# Patient Record
Sex: MALE | Race: WHITE | NOT HISPANIC OR LATINO | Employment: FULL TIME | URBAN - METROPOLITAN AREA
[De-identification: names, ages, dates, MRNs, and addresses within clinical notes are randomized per-mention and may not be internally consistent; named-entity substitution may affect disease eponyms.]

---

## 2018-01-11 ENCOUNTER — APPOINTMENT (OUTPATIENT)
Dept: GENERAL RADIOLOGY | Facility: HOSPITAL | Age: 58
End: 2018-01-11

## 2018-01-11 ENCOUNTER — HOSPITAL ENCOUNTER (INPATIENT)
Facility: HOSPITAL | Age: 58
LOS: 1 days | End: 2018-01-12
Attending: SPECIALIST | Admitting: SPECIALIST

## 2018-01-11 ENCOUNTER — HOSPITAL ENCOUNTER (EMERGENCY)
Facility: HOSPITAL | Age: 58
Discharge: PSYCHIATRIC OR SUBSTANCE USE FACILITY | End: 2018-01-11
Attending: EMERGENCY MEDICINE | Admitting: EMERGENCY MEDICINE

## 2018-01-11 VITALS
SYSTOLIC BLOOD PRESSURE: 141 MMHG | HEART RATE: 62 BPM | DIASTOLIC BLOOD PRESSURE: 79 MMHG | WEIGHT: 275 LBS | RESPIRATION RATE: 17 BRPM | TEMPERATURE: 97 F | OXYGEN SATURATION: 90 % | BODY MASS INDEX: 35.31 KG/M2

## 2018-01-11 VITALS
BODY MASS INDEX: 34.65 KG/M2 | OXYGEN SATURATION: 90 % | SYSTOLIC BLOOD PRESSURE: 163 MMHG | WEIGHT: 270 LBS | TEMPERATURE: 98.1 F | HEIGHT: 74 IN | RESPIRATION RATE: 17 BRPM | DIASTOLIC BLOOD PRESSURE: 91 MMHG | HEART RATE: 65 BPM

## 2018-01-11 DIAGNOSIS — I10 UNCONTROLLED HYPERTENSION: ICD-10-CM

## 2018-01-11 DIAGNOSIS — F41.1 ANXIETY REACTION: ICD-10-CM

## 2018-01-11 DIAGNOSIS — R07.9 CHEST PAIN, UNSPECIFIED TYPE: ICD-10-CM

## 2018-01-11 DIAGNOSIS — F32.2 SEVERE MAJOR DEPRESSION (HCC): Primary | ICD-10-CM

## 2018-01-11 PROBLEM — F32.A DEPRESSION WITH SUICIDAL IDEATION: Status: ACTIVE | Noted: 2018-01-11

## 2018-01-11 PROBLEM — E11.9 DM2 (DIABETES MELLITUS, TYPE 2) (HCC): Status: ACTIVE | Noted: 2018-01-11

## 2018-01-11 PROBLEM — F17.200 SMOKING: Status: ACTIVE | Noted: 2018-01-11

## 2018-01-11 PROBLEM — R51.9 HEADACHE: Status: ACTIVE | Noted: 2018-01-11

## 2018-01-11 PROBLEM — Z99.89 OSA ON CPAP: Status: ACTIVE | Noted: 2018-01-11

## 2018-01-11 PROBLEM — E78.5 DYSLIPIDEMIA: Status: ACTIVE | Noted: 2018-01-11

## 2018-01-11 PROBLEM — R45.851 DEPRESSION WITH SUICIDAL IDEATION: Status: ACTIVE | Noted: 2018-01-11

## 2018-01-11 PROBLEM — K21.9 GERD (GASTROESOPHAGEAL REFLUX DISEASE): Status: ACTIVE | Noted: 2018-01-11

## 2018-01-11 PROBLEM — G47.33 OSA (OBSTRUCTIVE SLEEP APNEA): Status: ACTIVE | Noted: 2018-01-11

## 2018-01-11 LAB
ALBUMIN SERPL-MCNC: 4.3 G/DL (ref 3.5–5.2)
ALBUMIN/GLOB SERPL: 1.1 G/DL
ALP SERPL-CCNC: 42 U/L (ref 39–117)
ALT SERPL W P-5'-P-CCNC: 33 U/L (ref 1–41)
AMPHET+METHAMPHET UR QL: NEGATIVE
ANION GAP SERPL CALCULATED.3IONS-SCNC: 10.9 MMOL/L
AST SERPL-CCNC: 23 U/L (ref 1–40)
BACTERIA UR QL AUTO: ABNORMAL /HPF
BARBITURATES UR QL SCN: POSITIVE
BASOPHILS # BLD AUTO: 0.03 10*3/MM3 (ref 0–0.2)
BASOPHILS NFR BLD AUTO: 0.5 % (ref 0–1.5)
BENZODIAZ UR QL SCN: POSITIVE
BILIRUB SERPL-MCNC: 0.4 MG/DL (ref 0.1–1.2)
BILIRUB UR QL STRIP: NEGATIVE
BUN BLD-MCNC: 16 MG/DL (ref 6–20)
BUN/CREAT SERPL: 14.7 (ref 7–25)
CALCIUM SPEC-SCNC: 9.9 MG/DL (ref 8.6–10.5)
CANNABINOIDS SERPL QL: NEGATIVE
CHLORIDE SERPL-SCNC: 101 MMOL/L (ref 98–107)
CLARITY UR: CLEAR
CO2 SERPL-SCNC: 26.1 MMOL/L (ref 22–29)
COCAINE UR QL: NEGATIVE
COLOR UR: YELLOW
CREAT BLD-MCNC: 1.09 MG/DL (ref 0.76–1.27)
DEPRECATED RDW RBC AUTO: 44 FL (ref 37–54)
EOSINOPHIL # BLD AUTO: 0.14 10*3/MM3 (ref 0–0.7)
EOSINOPHIL NFR BLD AUTO: 2.2 % (ref 0.3–6.2)
ERYTHROCYTE [DISTWIDTH] IN BLOOD BY AUTOMATED COUNT: 13.1 % (ref 11.5–14.5)
ETHANOL BLD-MCNC: <10 MG/DL (ref 0–10)
ETHANOL UR QL: <0.01 %
GFR SERPL CREATININE-BSD FRML MDRD: 70 ML/MIN/1.73
GFR SERPL CREATININE-BSD FRML MDRD: 85 ML/MIN/1.73
GLOBULIN UR ELPH-MCNC: 4 GM/DL
GLUCOSE BLD-MCNC: 123 MG/DL (ref 65–99)
GLUCOSE BLDC GLUCOMTR-MCNC: 99 MG/DL (ref 70–130)
GLUCOSE UR STRIP-MCNC: NEGATIVE MG/DL
HCT VFR BLD AUTO: 49.5 % (ref 40.4–52.2)
HGB BLD-MCNC: 16.7 G/DL (ref 13.7–17.6)
HGB UR QL STRIP.AUTO: ABNORMAL
HYALINE CASTS UR QL AUTO: ABNORMAL /LPF
IMM GRANULOCYTES # BLD: 0 10*3/MM3 (ref 0–0.03)
IMM GRANULOCYTES NFR BLD: 0 % (ref 0–0.5)
INR PPP: 1.03 (ref 0.9–1.1)
KETONES UR QL STRIP: NEGATIVE
LEUKOCYTE ESTERASE UR QL STRIP.AUTO: ABNORMAL
LYMPHOCYTES # BLD AUTO: 2.71 10*3/MM3 (ref 0.9–4.8)
LYMPHOCYTES NFR BLD AUTO: 42.9 % (ref 19.6–45.3)
MCH RBC QN AUTO: 31.3 PG (ref 27–32.7)
MCHC RBC AUTO-ENTMCNC: 33.7 G/DL (ref 32.6–36.4)
MCV RBC AUTO: 92.9 FL (ref 79.8–96.2)
METHADONE UR QL SCN: NEGATIVE
MONOCYTES # BLD AUTO: 0.48 10*3/MM3 (ref 0.2–1.2)
MONOCYTES NFR BLD AUTO: 7.6 % (ref 5–12)
NEUTROPHILS # BLD AUTO: 2.96 10*3/MM3 (ref 1.9–8.1)
NEUTROPHILS NFR BLD AUTO: 46.8 % (ref 42.7–76)
NITRITE UR QL STRIP: NEGATIVE
OPIATES UR QL: NEGATIVE
OXYCODONE UR QL SCN: NEGATIVE
PH UR STRIP.AUTO: 5.5 [PH] (ref 5–8)
PLATELET # BLD AUTO: 247 10*3/MM3 (ref 140–500)
PMV BLD AUTO: 10.2 FL (ref 6–12)
POTASSIUM BLD-SCNC: 3.9 MMOL/L (ref 3.5–5.2)
PROT SERPL-MCNC: 8.3 G/DL (ref 6–8.5)
PROT UR QL STRIP: NEGATIVE
PROTHROMBIN TIME: 13.1 SECONDS (ref 11.7–14.2)
RBC # BLD AUTO: 5.33 10*6/MM3 (ref 4.6–6)
RBC # UR: ABNORMAL /HPF
REF LAB TEST METHOD: ABNORMAL
SODIUM BLD-SCNC: 138 MMOL/L (ref 136–145)
SP GR UR STRIP: 1.02 (ref 1–1.03)
SQUAMOUS #/AREA URNS HPF: ABNORMAL /HPF
T4 FREE SERPL-MCNC: 1.16 NG/DL (ref 0.93–1.7)
TROPONIN T SERPL-MCNC: <0.01 NG/ML (ref 0–0.03)
TSH SERPL DL<=0.05 MIU/L-ACNC: 1.17 MIU/ML (ref 0.27–4.2)
UROBILINOGEN UR QL STRIP: ABNORMAL
WBC NRBC COR # BLD: 6.32 10*3/MM3 (ref 4.5–10.7)
WBC UR QL AUTO: ABNORMAL /HPF

## 2018-01-11 PROCEDURE — 80307 DRUG TEST PRSMV CHEM ANLYZR: CPT | Performed by: EMERGENCY MEDICINE

## 2018-01-11 PROCEDURE — 25010000002 ONDANSETRON PER 1 MG: Performed by: EMERGENCY MEDICINE

## 2018-01-11 PROCEDURE — 93005 ELECTROCARDIOGRAM TRACING: CPT | Performed by: EMERGENCY MEDICINE

## 2018-01-11 PROCEDURE — 96376 TX/PRO/DX INJ SAME DRUG ADON: CPT

## 2018-01-11 PROCEDURE — 84484 ASSAY OF TROPONIN QUANT: CPT | Performed by: EMERGENCY MEDICINE

## 2018-01-11 PROCEDURE — 85025 COMPLETE CBC W/AUTO DIFF WBC: CPT | Performed by: EMERGENCY MEDICINE

## 2018-01-11 PROCEDURE — 71046 X-RAY EXAM CHEST 2 VIEWS: CPT

## 2018-01-11 PROCEDURE — 81001 URINALYSIS AUTO W/SCOPE: CPT | Performed by: SPECIALIST

## 2018-01-11 PROCEDURE — 84439 ASSAY OF FREE THYROXINE: CPT | Performed by: SPECIALIST

## 2018-01-11 PROCEDURE — 80053 COMPREHEN METABOLIC PANEL: CPT | Performed by: EMERGENCY MEDICINE

## 2018-01-11 PROCEDURE — 90791 PSYCH DIAGNOSTIC EVALUATION: CPT | Performed by: SOCIAL WORKER

## 2018-01-11 PROCEDURE — 85610 PROTHROMBIN TIME: CPT | Performed by: EMERGENCY MEDICINE

## 2018-01-11 PROCEDURE — 96375 TX/PRO/DX INJ NEW DRUG ADDON: CPT

## 2018-01-11 PROCEDURE — 96374 THER/PROPH/DIAG INJ IV PUSH: CPT

## 2018-01-11 PROCEDURE — 84443 ASSAY THYROID STIM HORMONE: CPT | Performed by: SPECIALIST

## 2018-01-11 PROCEDURE — 93010 ELECTROCARDIOGRAM REPORT: CPT | Performed by: INTERNAL MEDICINE

## 2018-01-11 PROCEDURE — 99284 EMERGENCY DEPT VISIT MOD MDM: CPT

## 2018-01-11 PROCEDURE — 82962 GLUCOSE BLOOD TEST: CPT

## 2018-01-11 RX ORDER — NEBIVOLOL 10 MG/1
10 TABLET ORAL DAILY
COMMUNITY
End: 2019-01-10

## 2018-01-11 RX ORDER — EZETIMIBE/ATORVASTATIN CALCIUM 10 MG-10MG
10 TABLET ORAL DAILY
COMMUNITY

## 2018-01-11 RX ORDER — FLUOXETINE 10 MG/1
10 CAPSULE ORAL DAILY
Status: CANCELLED | OUTPATIENT
Start: 2018-01-12 | End: 2018-01-13

## 2018-01-11 RX ORDER — ONDANSETRON 2 MG/ML
4 INJECTION INTRAMUSCULAR; INTRAVENOUS ONCE
Status: COMPLETED | OUTPATIENT
Start: 2018-01-11 | End: 2018-01-11

## 2018-01-11 RX ORDER — ALPRAZOLAM 1 MG/1
1 TABLET ORAL 3 TIMES DAILY PRN
COMMUNITY

## 2018-01-11 RX ORDER — LISINOPRIL 40 MG/1
40 TABLET ORAL DAILY
Status: CANCELLED | OUTPATIENT
Start: 2018-01-12

## 2018-01-11 RX ORDER — DEXTROSE MONOHYDRATE 25 G/50ML
25 INJECTION, SOLUTION INTRAVENOUS
Status: CANCELLED | OUTPATIENT
Start: 2018-01-11

## 2018-01-11 RX ORDER — NICOTINE 21 MG/24HR
1 PATCH, TRANSDERMAL 24 HOURS TRANSDERMAL EVERY 24 HOURS
Status: DISCONTINUED | OUTPATIENT
Start: 2018-01-11 | End: 2018-01-12 | Stop reason: HOSPADM

## 2018-01-11 RX ORDER — BUTALBITAL, ACETAMINOPHEN AND CAFFEINE 50; 325; 40 MG/1; MG/1; MG/1
1 TABLET ORAL EVERY 6 HOURS PRN
Status: CANCELLED | OUTPATIENT
Start: 2018-01-11

## 2018-01-11 RX ORDER — ACETAMINOPHEN 325 MG/1
650 TABLET ORAL EVERY 6 HOURS PRN
Status: CANCELLED | OUTPATIENT
Start: 2018-01-11

## 2018-01-11 RX ORDER — ATORVASTATIN CALCIUM 10 MG/1
5 TABLET, FILM COATED ORAL NIGHTLY
Status: DISCONTINUED | OUTPATIENT
Start: 2018-01-11 | End: 2018-01-12 | Stop reason: HOSPADM

## 2018-01-11 RX ORDER — ALPRAZOLAM 1 MG/1
1 TABLET ORAL 3 TIMES DAILY PRN
Status: CANCELLED | OUTPATIENT
Start: 2018-01-11 | End: 2018-01-21

## 2018-01-11 RX ORDER — FENOFIBRATE 160 MG/1
160 TABLET ORAL DAILY
COMMUNITY
End: 2018-01-12 | Stop reason: HOSPADM

## 2018-01-11 RX ORDER — LISINOPRIL 20 MG/1
20 TABLET ORAL DAILY
COMMUNITY
End: 2018-01-11

## 2018-01-11 RX ORDER — FLUOXETINE 10 MG/1
10 CAPSULE ORAL DAILY
Status: DISCONTINUED | OUTPATIENT
Start: 2018-01-12 | End: 2018-01-12 | Stop reason: HOSPADM

## 2018-01-11 RX ORDER — ASPIRIN 81 MG/1
81 TABLET, CHEWABLE ORAL DAILY
Status: DISCONTINUED | OUTPATIENT
Start: 2018-01-12 | End: 2018-01-12 | Stop reason: HOSPADM

## 2018-01-11 RX ORDER — LABETALOL HYDROCHLORIDE 5 MG/ML
10 INJECTION, SOLUTION INTRAVENOUS ONCE
Status: DISCONTINUED | OUTPATIENT
Start: 2018-01-11 | End: 2018-01-11

## 2018-01-11 RX ORDER — BUTALBITAL/ACETAMINOPHEN 50MG-325MG
1 TABLET ORAL EVERY 4 HOURS PRN
Status: CANCELLED | OUTPATIENT
Start: 2018-01-11

## 2018-01-11 RX ORDER — FENOFIBRATE 145 MG/1
145 TABLET, COATED ORAL DAILY
Status: DISCONTINUED | OUTPATIENT
Start: 2018-01-12 | End: 2018-01-12 | Stop reason: HOSPADM

## 2018-01-11 RX ORDER — FLUOXETINE 10 MG/1
10 CAPSULE ORAL DAILY
COMMUNITY
End: 2018-01-14 | Stop reason: HOSPADM

## 2018-01-11 RX ORDER — BUTALBITAL/ACETAMINOPHEN 50MG-325MG
1 TABLET ORAL EVERY 4 HOURS PRN
COMMUNITY
End: 2019-01-10

## 2018-01-11 RX ORDER — LISINOPRIL 40 MG/1
40 TABLET ORAL DAILY
Qty: 30 TABLET | Refills: 1 | Status: ON HOLD | OUTPATIENT
Start: 2018-01-11 | End: 2019-01-11 | Stop reason: SDUPTHER

## 2018-01-11 RX ORDER — LABETALOL HYDROCHLORIDE 5 MG/ML
10 INJECTION, SOLUTION INTRAVENOUS ONCE
Status: COMPLETED | OUTPATIENT
Start: 2018-01-11 | End: 2018-01-11

## 2018-01-11 RX ORDER — NEBIVOLOL 10 MG/1
10 TABLET ORAL DAILY
Status: DISCONTINUED | OUTPATIENT
Start: 2018-01-12 | End: 2018-01-12 | Stop reason: HOSPADM

## 2018-01-11 RX ORDER — ALPRAZOLAM 1 MG/1
1 TABLET ORAL 3 TIMES DAILY PRN
Status: DISCONTINUED | OUTPATIENT
Start: 2018-01-11 | End: 2018-01-12 | Stop reason: HOSPADM

## 2018-01-11 RX ORDER — ACETAMINOPHEN 325 MG/1
650 TABLET ORAL EVERY 6 HOURS PRN
Status: DISCONTINUED | OUTPATIENT
Start: 2018-01-11 | End: 2018-01-12 | Stop reason: HOSPADM

## 2018-01-11 RX ORDER — FENOFIBRATE 145 MG/1
145 TABLET, COATED ORAL DAILY
Status: CANCELLED | OUTPATIENT
Start: 2018-01-12

## 2018-01-11 RX ORDER — SODIUM CHLORIDE 0.9 % (FLUSH) 0.9 %
1-10 SYRINGE (ML) INJECTION AS NEEDED
Status: CANCELLED | OUTPATIENT
Start: 2018-01-11

## 2018-01-11 RX ORDER — ALUMINA, MAGNESIA, AND SIMETHICONE 2400; 2400; 240 MG/30ML; MG/30ML; MG/30ML
15 SUSPENSION ORAL EVERY 6 HOURS PRN
Status: CANCELLED | OUTPATIENT
Start: 2018-01-11

## 2018-01-11 RX ORDER — HEPARIN SODIUM 5000 [USP'U]/ML
5000 INJECTION, SOLUTION INTRAVENOUS; SUBCUTANEOUS EVERY 12 HOURS SCHEDULED
Status: CANCELLED | OUTPATIENT
Start: 2018-01-12

## 2018-01-11 RX ORDER — ASPIRIN 81 MG/1
81 TABLET, CHEWABLE ORAL DAILY
COMMUNITY
End: 2019-01-10

## 2018-01-11 RX ORDER — ASPIRIN 81 MG/1
81 TABLET, CHEWABLE ORAL DAILY
Status: CANCELLED | OUTPATIENT
Start: 2018-01-12 | End: 2018-01-13

## 2018-01-11 RX ORDER — NICOTINE 21 MG/24HR
1 PATCH, TRANSDERMAL 24 HOURS TRANSDERMAL EVERY 24 HOURS
Status: CANCELLED | OUTPATIENT
Start: 2018-01-12

## 2018-01-11 RX ORDER — BUTALBITAL/ACETAMINOPHEN 50MG-325MG
1 TABLET ORAL EVERY 4 HOURS PRN
Status: DISCONTINUED | OUTPATIENT
Start: 2018-01-11 | End: 2018-01-12 | Stop reason: HOSPADM

## 2018-01-11 RX ORDER — LABETALOL HYDROCHLORIDE 5 MG/ML
20 INJECTION, SOLUTION INTRAVENOUS ONCE
Status: COMPLETED | OUTPATIENT
Start: 2018-01-11 | End: 2018-01-11

## 2018-01-11 RX ORDER — NEBIVOLOL 10 MG/1
10 TABLET ORAL DAILY
Status: CANCELLED | OUTPATIENT
Start: 2018-01-12 | End: 2018-01-13

## 2018-01-11 RX ORDER — NICOTINE POLACRILEX 4 MG
15 LOZENGE BUCCAL
Status: CANCELLED | OUTPATIENT
Start: 2018-01-11

## 2018-01-11 RX ORDER — ALUMINA, MAGNESIA, AND SIMETHICONE 2400; 2400; 240 MG/30ML; MG/30ML; MG/30ML
15 SUSPENSION ORAL EVERY 6 HOURS PRN
Status: DISCONTINUED | OUTPATIENT
Start: 2018-01-11 | End: 2018-01-12 | Stop reason: HOSPADM

## 2018-01-11 RX ORDER — PANTOPRAZOLE SODIUM 40 MG/1
40 TABLET, DELAYED RELEASE ORAL
Status: CANCELLED | OUTPATIENT
Start: 2018-01-12 | End: 2018-01-13

## 2018-01-11 RX ORDER — PANTOPRAZOLE SODIUM 40 MG/1
40 TABLET, DELAYED RELEASE ORAL
Status: DISCONTINUED | OUTPATIENT
Start: 2018-01-12 | End: 2018-01-12 | Stop reason: HOSPADM

## 2018-01-11 RX ORDER — OMEPRAZOLE 20 MG/1
40 CAPSULE, DELAYED RELEASE ORAL DAILY
COMMUNITY

## 2018-01-11 RX ADMIN — ACETAMINOPHEN 650 MG: 325 TABLET ORAL at 20:19

## 2018-01-11 RX ADMIN — ALPRAZOLAM 1 MG: 1 TABLET ORAL at 21:58

## 2018-01-11 RX ADMIN — METFORMIN HYDROCHLORIDE 500 MG: 500 TABLET ORAL at 20:19

## 2018-01-11 RX ADMIN — HYDROMORPHONE HYDROCHLORIDE 1 MG: 10 INJECTION INTRAMUSCULAR; INTRAVENOUS; SUBCUTANEOUS at 13:15

## 2018-01-11 RX ADMIN — LABETALOL HYDROCHLORIDE 20 MG: 5 INJECTION, SOLUTION INTRAVENOUS at 15:42

## 2018-01-11 RX ADMIN — ONDANSETRON 4 MG: 2 INJECTION INTRAMUSCULAR; INTRAVENOUS at 13:15

## 2018-01-11 RX ADMIN — LABETALOL HYDROCHLORIDE 10 MG: 5 INJECTION, SOLUTION INTRAVENOUS at 13:14

## 2018-01-11 NOTE — ED NOTES
Spoke with Dr. Powell about pt's wife's voiced concerns about his depression. He states that he will order psych evaluation after results.      Yoli Schneider RN  01/11/18 4845

## 2018-01-11 NOTE — CONSULTS
"59 y/o cauc  father of 4 referred to the [facility] ED by his PCP Dr. Liang due to concerns for his increasing depression and concerns for his safety.  He has also been having a headache and elevated BP.  He was evaluated 3 days ago at [facility] and referred to their IOP. Patient started yesterday.  Patient called PCP today and reported increased depression and above medical issues.  Patient reports that he has multiple stressors. His primary issues is that he is under indictment for a federal crime.  Patient reports that as a result of it he resolved as  of [facility].  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 He states the [facility]  has told him that the charges will probably be dropped.  He states that a rumor was out in the county that the rumor was out that they were going to be dropped and politicians in [facility] filed a $630,000 lawsuit against him.  Due to  fees he has had increased financial issues.  Patient reports that he has been isolating in his \"[facility].\" He states that that is the only place that he feels safe.  He reports that over the years that he has seen multiple suicides and suicide attempts.  He states that he has always thought that he would never act on any suicidal thoughts but most recently he has began to wonder if he could actually do it.  He has never developed a specific plan.  Patient states that he has HTN but it has been much higher lately.  He has had to have BP medications in the ED.  He reports that he has had difficulty getting sleep.  His appetite is good.  He is diabetic.  Patient states he has begun to worry even more and can't stop thinking about what will happen if the charges are dropped.  He fears what will happen w/ he and wife if there isn't a decreased in legal financial burden.      Patient has been on 1 mg up to 3 times/ day for anxiety and 10 mg Prozac/day. They are prescribed by his PCP Dr. Alexandre Liang.  He denies any " hx of ETOH or illicit drug use.  He states that he ran for [employer] b/c of the struggle his oldest son had w/ drugs.  Son has been clean for 3 years. He attended [facility] for one day.    Patient lives w/ his 5th wife.  He has 4 children. Wife is supportive.  He is a retired .  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  He reports positive support from family.      Patient is alert and O x 4.  +SI without specific plan.  No HI.  No a/v hallucinations. Speech is appropriate. Insight and judgement are appropriate.  Discussed case w/ Dr. Cifuentes.  He will admit to [facility].  Patient is agreeable.  He has been provided the code number, visiting hours, and unit rules.

## 2018-01-11 NOTE — ED NOTES
Pt to ER c/o chest pressure and hypertension. Pt appears diaphoretic at triage. Pt placed in ER bed per request of Dr. Mason. EKG given to Dr. Powell per request of Dr. Mason. Pt's wife, who is at bedsides, states that pt has been under larger amounts of stress in personal life. When questioned, pt denies any intention of self harm or harm to others. Pt states that although he has been taking his medication, his blood pressure has been elevated over past few weeks.      Yoli Schneider RN  01/11/18 1437

## 2018-01-11 NOTE — ED PROVIDER NOTES
EMERGENCY DEPARTMENT ENCOUNTER    CHIEF COMPLAINT  Chief Complaint: Hypertension  History given by: pt/ spouse is present at [facility]  History limited by: N/A  Room Number: 40/40  PMD: Alexandre Liang MD      HPI:  Pt is a 58 y.o. male who presents complaining of hypertension for the past 2-3 days. Pt reports recent elevated BP's of around 185/95. Pt has a current BP of 176/ 91. PT states this is abnormal for him. Pt states his PCP monitors his BP, but pt does not monitor his own BP; pt states his BP was normal the last time he saw his PCP which was approximately 4-6 weeks ago. Pt also c/o sharp frontal HA that has been ongoing for 1 month with worsening over the past week [moderate to severe], diaphoresis, moderate chest discomfort for the past 2-3 days that progressed to chest pain, dizziness, 1 episode of diarrhea, and SOB, but denies fever, sore throat, cough, abdominal pain, dysuria, back/ leg pain, rash, or any other pertinent symptoms. Pt smokes up to 2 packs of cigarettes a day, but denies any alcohol or substance usage/ abuse. Pt states he has thought about suicide, but denies ever actually wanting to perform this action. PT has a hx of depression and spouse states that pt is under tremendous amounts of stress. Pt does have a hx of hypertension.      Duration/Onset/Timin-3 days, gradual, constant   Location: N/A  Radiation: N/A  Quality: Current BP of 176/91  Intensity/Severity: moderate   Associated Symptoms:  sharp frontal HA that has been ongoing for 1 month with worsening over the past week [moderate to severe], diaphoresis, moderate chest discomfort for the past 2-3 days that progressed to chest pain, dizziness, 1 episode of diarrhea and SOB  Aggravating or Alleviating Factors: None reported, but spouse states that pt is under tremendous stress   Previous Episodes: Pt has a hx of hypertension.       PAST MEDICAL HISTORY  Active Ambulatory Problems     Diagnosis Date Noted   • No Active Ambulatory  Problems     Resolved Ambulatory Problems     Diagnosis Date Noted   • No Resolved Ambulatory Problems     Past Medical History:   Diagnosis Date   • Anxiety    • Depression    • Diabetes mellitus    • GERD (gastroesophageal reflux disease)    • Head ache    • Hypertension        PAST SURGICAL HISTORY  Past Surgical History:   Procedure Laterality Date   • CHOLECYSTECTOMY         FAMILY HISTORY  History reviewed. No pertinent family history.    SOCIAL HISTORY  Social History     Social History   • Marital status:      Spouse name: N/A   • Number of children: N/A   • Years of education: N/A     Occupational History   • Not on file.     Social History Main Topics   • Smoking status: Current Every Day Smoker     Packs/day: 2.00   • Smokeless tobacco: Not on file   • Alcohol use No   • Drug use: No   • Sexual activity: Defer     Other Topics Concern   • Not on file     Social History Narrative   • No narrative on file       ALLERGIES  Codeine    REVIEW OF SYSTEMS  Review of Systems   Constitutional: Positive for diaphoresis. Negative for chills and fever.   HENT: Negative for sore throat.    Eyes: Negative.    Respiratory: Positive for shortness of breath. Negative for cough.    Cardiovascular: Positive for chest pain.   Gastrointestinal: Positive for diarrhea (x1 ). Negative for abdominal pain.   Genitourinary: Negative.  Negative for dysuria.   Musculoskeletal: Negative.  Negative for back pain and myalgias (leg pain ).   Skin: Negative.  Negative for rash.   Neurological: Positive for dizziness and headaches.       PHYSICAL EXAM  ED Triage Vitals   Temp Heart Rate Resp BP SpO2   -- 01/11/18 1154 01/11/18 1154 01/11/18 1154 01/11/18 1154    76 20 183/97 93 %      Temp src Heart Rate Source Patient Position BP Location FiO2 (%)   -- 01/11/18 1154 -- -- --    Monitor          Physical Exam   Constitutional: He is well-developed, well-nourished, and in no distress. No distress.   Current / 91  Pt is tearful  on exam    HENT:   Head: Normocephalic and atraumatic.   Mouth/Throat: Oropharynx is clear and moist.   Eyes:   Unremarkable   Cardiovascular: Normal rate, regular rhythm and normal heart sounds.    No murmur heard.  Pulmonary/Chest: Breath sounds normal. No respiratory distress.   Lungs clear to auscultation    Abdominal: There is tenderness (mild LUQ).   Musculoskeletal: He exhibits no edema or tenderness.   Neurological: He is alert.   Skin: No rash noted.   ACE Bandage to R hand    Psychiatric: His mood appears anxious.   Nursing note and vitals reviewed.      LAB RESULTS  Lab Results (last 24 hours)     Procedure Component Value Units Date/Time    CBC & Differential [[id number]] Collected:  01/11/18 1238    Specimen:  Blood Updated:  01/11/18 1250    Narrative:       The following orders were created for panel order CBC & Differential.  Procedure                               Abnormality         Status                     ---------                               -----------         ------                     CBC Auto Differential[[id number]]        Normal              Final result                 Please view results for these tests on the individual orders.    Comprehensive Metabolic Panel [[id number]]  (Abnormal) Collected:  01/11/18 1238    Specimen:  Blood Updated:  01/11/18 1308     Glucose 123 (H) mg/dL      BUN 16 mg/dL      Creatinine 1.09 mg/dL      Sodium 138 mmol/L      Potassium 3.9 mmol/L      Chloride 101 mmol/L      CO2 26.1 mmol/L      Calcium 9.9 mg/dL      Total Protein 8.3 g/dL      Albumin 4.30 g/dL      ALT (SGPT) 33 U/L      AST (SGOT) 23 U/L      Alkaline Phosphatase 42 U/L      Total Bilirubin 0.4 mg/dL      eGFR Non African Amer 70 mL/min/1.73      eGFR  African Amer 85 mL/min/1.73      Globulin 4.0 gm/dL      A/G Ratio 1.1 g/dL      BUN/Creatinine Ratio 14.7     Anion Gap 10.9 mmol/L     Troponin [[id number]]  (Normal) Collected:  01/11/18 1238    Specimen:  Blood Updated:  01/11/18 1311      Troponin T <0.010 ng/mL     Narrative:       Troponin T Reference Ranges:  Less than 0.03 ng/mL:    Negative for AMI  0.03 to 0.09 ng/mL:      Indeterminant for AMI  Greater than 0.09 ng/mL: Positive for AMI    Ethanol [[id number]] Collected:  01/11/18 1238    Specimen:  Blood Updated:  01/11/18 1308     Ethanol <10 mg/dL      Ethanol % <0.010 %     Protime-INR [[id number]]  (Normal) Collected:  01/11/18 1238    Specimen:  Blood Updated:  01/11/18 1259     Protime 13.1 Seconds      INR 1.03    CBC Auto Differential [[id number]]  (Normal) Collected:  01/11/18 1238    Specimen:  Blood Updated:  01/11/18 1250     WBC 6.32 10*3/mm3      RBC 5.33 10*6/mm3      Hemoglobin 16.7 g/dL      Hematocrit 49.5 %      MCV 92.9 fL      MCH 31.3 pg      MCHC 33.7 g/dL      RDW 13.1 %      RDW-SD 44.0 fl      MPV 10.2 fL      Platelets 247 10*3/mm3      Neutrophil % 46.8 %      Lymphocyte % 42.9 %      Monocyte % 7.6 %      Eosinophil % 2.2 %      Basophil % 0.5 %      Immature Grans % 0.0 %      Neutrophils, Absolute 2.96 10*3/mm3      Lymphocytes, Absolute 2.71 10*3/mm3      Monocytes, Absolute 0.48 10*3/mm3      Eosinophils, Absolute 0.14 10*3/mm3      Basophils, Absolute 0.03 10*3/mm3      Immature Grans, Absolute 0.00 10*3/mm3     Urine Drug Screen - Urine, Clean Catch [[id number]]  (Abnormal) Collected:  01/11/18 1306    Specimen:  Urine from Urine, Clean Catch Updated:  01/11/18 1341     Amphet/Methamphet, Screen Negative     Barbiturates Screen, Urine Positive (A)     Benzodiazepine Screen, Urine Positive (A)     Cocaine Screen, Urine Negative     Opiate Screen Negative     THC, Screen, Urine Negative     Methadone Screen, Urine Negative     Oxycodone Screen, Urine Negative    Narrative:       Negative Thresholds For Drugs Screened:     Amphetamines               500 ng/ml   Barbiturates               200 ng/ml   Benzodiazepines            100 ng/ml   Cocaine                    300 ng/ml   Methadone                  300 ng/ml    Opiates                    300 ng/ml   Oxycodone                  100 ng/ml   THC                        50 ng/ml    The Normal Value for all drugs tested is negative. This report includes final unconfirmed screening results to be used for medical treatment purposes only. Unconfirmed results must not be used for non-medical purposes such as employment or legal testing. Clinical consideration should be applied to any drug of abuse test, particulary when unconfirmed results are used.    POC Glucose Once [[id number]]  (Normal) Collected:  01/11/18 1313    Specimen:  Blood Updated:  01/11/18 1314     Glucose 99 mg/dL     Narrative:       Meter: [id number] : [id number] Jose Ocampo          I ordered the above labs and reviewed the results    RADIOLOGY  XR Chest 2 View   Final Result   IMPRESSIONS: Probable borderline CHF.        I ordered the above noted radiological studies. Interpreted by radiologist. Reviewed by me in PACS.       PROCEDURES  Procedures  EKG           EKG time: 1137  Rhythm/Rate: sinus 78  P waves and WV: normal  QRS, axis: normal    ST and T waves: normal      Interpreted Contemporaneously by me, independently viewed  No previous for comparison       PROGRESS AND CONSULTS  ED Course   Comment By Time   12:25 PM  57 yo under severe stress (legal issues) was seen recently at [facility] with depression and SI.  He was set for IOP (had 1 yesterday).  Pt here due to elevated BP (180s/90s) last 2 days and multiple somatic complaints - HA, CP, SOA and abd pain.  Sent to ER by PCP for medical evaluation and psychiatric consult - denies suicidal intent but admits to severe depression and suicidal thoughts. Xiang Powell MD 01/11 1227     1224  Ordered labs and Chest XR for further evaluation. Ordered Labetalol for BP and Zofran/ Dilaudid for pain.     1403  Placed consult to [facility].     1404  Rechecked pt who is resting comfortably and reports that his HA has eased up. Pt has a current /88. Discussed  radiology/ lab results with pt. Discussed that pt's BP is elevated most likely due to stress; plan to adjust pt's daily BP medications. Discussed plan to have [facility] consult pt. Pt understands and agrees to this plan, all questions addressed at this time.     1531  Ordered repeat Labetalol due to pt's BP becoming elevated again.      1615  Discussed pt's case with Tu from [facility] who states that pt is being admitted.    MEDICAL DECISION MAKING  Results were reviewed/discussed with the patient and they were also made aware of online access. Pt also made aware that some labs, such as cultures, will not be resulted during ER visit and follow up with PMD is necessary.     MDM  Number of Diagnoses or Management Options     Amount and/or Complexity of Data Reviewed  Clinical lab tests: ordered and reviewed (Drug screen: Benzo and Barbiturate positive   Otherwise unremarkable )  Tests in the radiology section of CPT®: ordered and reviewed (Chest XR  IMPRESSIONS: Probable borderline CHF.)  Tests in the medicine section of CPT®: ordered and reviewed (Refer to procedure )  Independent visualization of images, tracings, or specimens: yes           DIAGNOSIS  Final diagnoses:   Uncontrolled hypertension   Anxiety reaction   Chest pain, unspecified type   Severe major depression       DISPOSITION  Admitted to Behavioral Health     Latest Documented Vital Signs:  As of 4:22 PM  BP- 145/92 HR- 68 Temp- 98.1 °F (36.7 °C) (Oral) O2 sat- 93%    --  Documentation assistance provided by georgia Hoffman for Dr. Powell.  Information recorded by the scribe was done at my direction and has been verified and validated by me.               Patrice Hoffman  01/11/18 1623       Xiang Powell MD  01/11/18 2024

## 2018-01-12 ENCOUNTER — HOSPITAL ENCOUNTER (OUTPATIENT)
Facility: HOSPITAL | Age: 58
Setting detail: OBSERVATION
End: 2018-01-12
Attending: INTERNAL MEDICINE | Admitting: INTERNAL MEDICINE

## 2018-01-12 ENCOUNTER — APPOINTMENT (OUTPATIENT)
Dept: NUCLEAR MEDICINE | Facility: HOSPITAL | Age: 58
End: 2018-01-12

## 2018-01-12 ENCOUNTER — APPOINTMENT (OUTPATIENT)
Dept: CARDIOLOGY | Facility: HOSPITAL | Age: 58
End: 2018-01-12
Attending: INTERNAL MEDICINE

## 2018-01-12 ENCOUNTER — HOSPITAL ENCOUNTER (INPATIENT)
Facility: HOSPITAL | Age: 58
LOS: 2 days | Discharge: HOME | End: 2018-01-14
Attending: SPECIALIST | Admitting: SPECIALIST

## 2018-01-12 ENCOUNTER — APPOINTMENT (OUTPATIENT)
Dept: CT IMAGING | Facility: HOSPITAL | Age: 58
End: 2018-01-12
Attending: INTERNAL MEDICINE

## 2018-01-12 VITALS
HEIGHT: 74 IN | TEMPERATURE: 97.6 F | WEIGHT: 274 LBS | SYSTOLIC BLOOD PRESSURE: 166 MMHG | DIASTOLIC BLOOD PRESSURE: 85 MMHG | RESPIRATION RATE: 18 BRPM | HEART RATE: 74 BPM | OXYGEN SATURATION: 96 % | BODY MASS INDEX: 35.16 KG/M2

## 2018-01-12 LAB
ALBUMIN SERPL-MCNC: 4 G/DL (ref 3.5–5.2)
ALBUMIN/GLOB SERPL: 1.1 G/DL
ALP SERPL-CCNC: 33 U/L (ref 39–117)
ALT SERPL W P-5'-P-CCNC: 26 U/L (ref 1–41)
ANION GAP SERPL CALCULATED.3IONS-SCNC: 12.2 MMOL/L
AST SERPL-CCNC: 21 U/L (ref 1–40)
BASOPHILS # BLD AUTO: 0.03 10*3/MM3 (ref 0–0.2)
BASOPHILS NFR BLD AUTO: 0.4 % (ref 0–1.5)
BH CV LOWER VASCULAR LEFT COMMON FEMORAL AUGMENT: NORMAL
BH CV LOWER VASCULAR LEFT COMMON FEMORAL COMPETENT: NORMAL
BH CV LOWER VASCULAR LEFT COMMON FEMORAL COMPRESS: NORMAL
BH CV LOWER VASCULAR LEFT COMMON FEMORAL PHASIC: NORMAL
BH CV LOWER VASCULAR LEFT COMMON FEMORAL SPONT: NORMAL
BH CV LOWER VASCULAR LEFT DISTAL FEMORAL COMPRESS: NORMAL
BH CV LOWER VASCULAR LEFT GASTRONEMIUS COMPRESS: NORMAL
BH CV LOWER VASCULAR LEFT GREATER SAPH AK COMPRESS: NORMAL
BH CV LOWER VASCULAR LEFT GREATER SAPH BK COMPRESS: NORMAL
BH CV LOWER VASCULAR LEFT LESSER SAPH COMPRESS: NORMAL
BH CV LOWER VASCULAR LEFT MID FEMORAL AUGMENT: NORMAL
BH CV LOWER VASCULAR LEFT MID FEMORAL COMPETENT: NORMAL
BH CV LOWER VASCULAR LEFT MID FEMORAL COMPRESS: NORMAL
BH CV LOWER VASCULAR LEFT MID FEMORAL PHASIC: NORMAL
BH CV LOWER VASCULAR LEFT MID FEMORAL SPONT: NORMAL
BH CV LOWER VASCULAR LEFT PERONEAL COMPRESS: NORMAL
BH CV LOWER VASCULAR LEFT POPLITEAL AUGMENT: NORMAL
BH CV LOWER VASCULAR LEFT POPLITEAL COMPETENT: NORMAL
BH CV LOWER VASCULAR LEFT POPLITEAL COMPRESS: NORMAL
BH CV LOWER VASCULAR LEFT POPLITEAL PHASIC: NORMAL
BH CV LOWER VASCULAR LEFT POPLITEAL SPONT: NORMAL
BH CV LOWER VASCULAR LEFT POSTERIOR TIBIAL COMPRESS: NORMAL
BH CV LOWER VASCULAR LEFT PROXIMAL FEMORAL COMPRESS: NORMAL
BH CV LOWER VASCULAR LEFT SAPHENOFEMORAL JUNCTION AUGMENT: NORMAL
BH CV LOWER VASCULAR LEFT SAPHENOFEMORAL JUNCTION COMPETENT: NORMAL
BH CV LOWER VASCULAR LEFT SAPHENOFEMORAL JUNCTION COMPRESS: NORMAL
BH CV LOWER VASCULAR LEFT SAPHENOFEMORAL JUNCTION PHASIC: NORMAL
BH CV LOWER VASCULAR LEFT SAPHENOFEMORAL JUNCTION SPONT: NORMAL
BH CV LOWER VASCULAR RIGHT COMMON FEMORAL AUGMENT: NORMAL
BH CV LOWER VASCULAR RIGHT COMMON FEMORAL COMPETENT: NORMAL
BH CV LOWER VASCULAR RIGHT COMMON FEMORAL COMPRESS: NORMAL
BH CV LOWER VASCULAR RIGHT COMMON FEMORAL PHASIC: NORMAL
BH CV LOWER VASCULAR RIGHT COMMON FEMORAL SPONT: NORMAL
BH CV LOWER VASCULAR RIGHT DISTAL FEMORAL COMPRESS: NORMAL
BH CV LOWER VASCULAR RIGHT GASTRONEMIUS COMPRESS: NORMAL
BH CV LOWER VASCULAR RIGHT GREATER SAPH AK COMPRESS: NORMAL
BH CV LOWER VASCULAR RIGHT GREATER SAPH BK COMPRESS: NORMAL
BH CV LOWER VASCULAR RIGHT LESSER SAPH COMPRESS: NORMAL
BH CV LOWER VASCULAR RIGHT MID FEMORAL AUGMENT: NORMAL
BH CV LOWER VASCULAR RIGHT MID FEMORAL COMPETENT: NORMAL
BH CV LOWER VASCULAR RIGHT MID FEMORAL COMPRESS: NORMAL
BH CV LOWER VASCULAR RIGHT MID FEMORAL PHASIC: NORMAL
BH CV LOWER VASCULAR RIGHT MID FEMORAL SPONT: NORMAL
BH CV LOWER VASCULAR RIGHT PERONEAL COMPRESS: NORMAL
BH CV LOWER VASCULAR RIGHT POPLITEAL AUGMENT: NORMAL
BH CV LOWER VASCULAR RIGHT POPLITEAL COMPETENT: NORMAL
BH CV LOWER VASCULAR RIGHT POPLITEAL COMPRESS: NORMAL
BH CV LOWER VASCULAR RIGHT POPLITEAL PHASIC: NORMAL
BH CV LOWER VASCULAR RIGHT POPLITEAL SPONT: NORMAL
BH CV LOWER VASCULAR RIGHT POSTERIOR TIBIAL COMPRESS: NORMAL
BH CV LOWER VASCULAR RIGHT PROXIMAL FEMORAL COMPRESS: NORMAL
BH CV LOWER VASCULAR RIGHT SAPHENOFEMORAL JUNCTION AUGMENT: NORMAL
BH CV LOWER VASCULAR RIGHT SAPHENOFEMORAL JUNCTION COMPETENT: NORMAL
BH CV LOWER VASCULAR RIGHT SAPHENOFEMORAL JUNCTION COMPRESS: NORMAL
BH CV LOWER VASCULAR RIGHT SAPHENOFEMORAL JUNCTION PHASIC: NORMAL
BH CV LOWER VASCULAR RIGHT SAPHENOFEMORAL JUNCTION SPONT: NORMAL
BH CV STRESS COMMENTS STAGE 1: NORMAL
BH CV STRESS DOSE REGADENOSON STAGE 1: 0.4
BH CV STRESS DURATION MIN STAGE 1: 0
BH CV STRESS DURATION SEC STAGE 1: 15
BH CV STRESS PROTOCOL 1: NORMAL
BH CV STRESS RECOVERY BP: NORMAL MMHG
BH CV STRESS RECOVERY HR: 79 BPM
BH CV STRESS STAGE 1: 1
BILIRUB SERPL-MCNC: 0.4 MG/DL (ref 0.1–1.2)
BUN BLD-MCNC: 16 MG/DL (ref 6–20)
BUN/CREAT SERPL: 14.2 (ref 7–25)
CALCIUM SPEC-SCNC: 9.1 MG/DL (ref 8.6–10.5)
CHLORIDE SERPL-SCNC: 96 MMOL/L (ref 98–107)
CO2 SERPL-SCNC: 26.8 MMOL/L (ref 22–29)
CREAT BLD-MCNC: 1.13 MG/DL (ref 0.76–1.27)
D DIMER PPP FEU-MCNC: 2.63 MCGFEU/ML (ref 0–0.49)
DEPRECATED RDW RBC AUTO: 45 FL (ref 37–54)
EOSINOPHIL # BLD AUTO: 0.2 10*3/MM3 (ref 0–0.7)
EOSINOPHIL NFR BLD AUTO: 2.7 % (ref 0.3–6.2)
ERYTHROCYTE [DISTWIDTH] IN BLOOD BY AUTOMATED COUNT: 13.1 % (ref 11.5–14.5)
GFR SERPL CREATININE-BSD FRML MDRD: 67 ML/MIN/1.73
GFR SERPL CREATININE-BSD FRML MDRD: 81 ML/MIN/1.73
GLOBULIN UR ELPH-MCNC: 3.7 GM/DL
GLUCOSE BLD-MCNC: 96 MG/DL (ref 65–99)
GLUCOSE BLDC GLUCOMTR-MCNC: 105 MG/DL (ref 70–130)
GLUCOSE BLDC GLUCOMTR-MCNC: 97 MG/DL (ref 70–130)
HBA1C MFR BLD: 5.7 % (ref 4.8–5.6)
HCT VFR BLD AUTO: 45.5 % (ref 40.4–52.2)
HGB BLD-MCNC: 15.1 G/DL (ref 13.7–17.6)
IMM GRANULOCYTES # BLD: 0 10*3/MM3 (ref 0–0.03)
IMM GRANULOCYTES NFR BLD: 0 % (ref 0–0.5)
LV EF NUC BP: 50 %
LYMPHOCYTES # BLD AUTO: 3.57 10*3/MM3 (ref 0.9–4.8)
LYMPHOCYTES NFR BLD AUTO: 47.7 % (ref 19.6–45.3)
MAXIMAL PREDICTED HEART RATE: 163 BPM
MCH RBC QN AUTO: 31.3 PG (ref 27–32.7)
MCHC RBC AUTO-ENTMCNC: 33.2 G/DL (ref 32.6–36.4)
MCV RBC AUTO: 94.2 FL (ref 79.8–96.2)
MONOCYTES # BLD AUTO: 0.61 10*3/MM3 (ref 0.2–1.2)
MONOCYTES NFR BLD AUTO: 8.2 % (ref 5–12)
NEUTROPHILS # BLD AUTO: 3.07 10*3/MM3 (ref 1.9–8.1)
NEUTROPHILS NFR BLD AUTO: 41 % (ref 42.7–76)
PERCENT MAX PREDICTED HR: 58.9 %
PLATELET # BLD AUTO: 217 10*3/MM3 (ref 140–500)
PMV BLD AUTO: 9.8 FL (ref 6–12)
POTASSIUM BLD-SCNC: 3.8 MMOL/L (ref 3.5–5.2)
PROT SERPL-MCNC: 7.7 G/DL (ref 6–8.5)
RBC # BLD AUTO: 4.83 10*6/MM3 (ref 4.6–6)
SODIUM BLD-SCNC: 135 MMOL/L (ref 136–145)
STRESS BASELINE BP: NORMAL MMHG
STRESS BASELINE HR: 70 BPM
STRESS PERCENT HR: 69 %
STRESS POST PEAK BP: NORMAL MMHG
STRESS POST PEAK HR: 96 BPM
STRESS TARGET HR: 139 BPM
TROPONIN T SERPL-MCNC: <0.01 NG/ML (ref 0–0.03)
TSH SERPL DL<=0.05 MIU/L-ACNC: 2.38 MIU/ML (ref 0.27–4.2)
WBC NRBC COR # BLD: 7.48 10*3/MM3 (ref 4.5–10.7)

## 2018-01-12 PROCEDURE — A9500 TC99M SESTAMIBI: HCPCS | Performed by: INTERNAL MEDICINE

## 2018-01-12 PROCEDURE — 93017 CV STRESS TEST TRACING ONLY: CPT

## 2018-01-12 PROCEDURE — 93005 ELECTROCARDIOGRAM TRACING: CPT | Performed by: INTERNAL MEDICINE

## 2018-01-12 PROCEDURE — G0378 HOSPITAL OBSERVATION PER HR: HCPCS

## 2018-01-12 PROCEDURE — 93010 ELECTROCARDIOGRAM REPORT: CPT | Performed by: INTERNAL MEDICINE

## 2018-01-12 PROCEDURE — 0 TECHNETIUM SESTAMIBI: Performed by: INTERNAL MEDICINE

## 2018-01-12 PROCEDURE — 93016 CV STRESS TEST SUPVJ ONLY: CPT | Performed by: INTERNAL MEDICINE

## 2018-01-12 PROCEDURE — 84484 ASSAY OF TROPONIN QUANT: CPT | Performed by: INTERNAL MEDICINE

## 2018-01-12 PROCEDURE — 85025 COMPLETE CBC W/AUTO DIFF WBC: CPT | Performed by: INTERNAL MEDICINE

## 2018-01-12 PROCEDURE — 84443 ASSAY THYROID STIM HORMONE: CPT | Performed by: INTERNAL MEDICINE

## 2018-01-12 PROCEDURE — 93018 CV STRESS TEST I&R ONLY: CPT | Performed by: INTERNAL MEDICINE

## 2018-01-12 PROCEDURE — 85379 FIBRIN DEGRADATION QUANT: CPT | Performed by: INTERNAL MEDICINE

## 2018-01-12 PROCEDURE — 25010000002 HEPARIN (PORCINE) PER 1000 UNITS: Performed by: INTERNAL MEDICINE

## 2018-01-12 PROCEDURE — 96372 THER/PROPH/DIAG INJ SC/IM: CPT

## 2018-01-12 PROCEDURE — 0 IOPAMIDOL PER 1 ML: Performed by: INTERNAL MEDICINE

## 2018-01-12 PROCEDURE — 78452 HT MUSCLE IMAGE SPECT MULT: CPT | Performed by: INTERNAL MEDICINE

## 2018-01-12 PROCEDURE — 93970 EXTREMITY STUDY: CPT

## 2018-01-12 PROCEDURE — 25010000002 REGADENOSON 0.4 MG/5ML SOLUTION: Performed by: INTERNAL MEDICINE

## 2018-01-12 PROCEDURE — 83036 HEMOGLOBIN GLYCOSYLATED A1C: CPT | Performed by: INTERNAL MEDICINE

## 2018-01-12 PROCEDURE — 71275 CT ANGIOGRAPHY CHEST: CPT

## 2018-01-12 PROCEDURE — 78452 HT MUSCLE IMAGE SPECT MULT: CPT

## 2018-01-12 PROCEDURE — 82962 GLUCOSE BLOOD TEST: CPT

## 2018-01-12 PROCEDURE — 80053 COMPREHEN METABOLIC PANEL: CPT | Performed by: INTERNAL MEDICINE

## 2018-01-12 RX ORDER — ACETAMINOPHEN 325 MG/1
650 TABLET ORAL EVERY 6 HOURS PRN
Status: DISCONTINUED | OUTPATIENT
Start: 2018-01-12 | End: 2018-01-12 | Stop reason: HOSPADM

## 2018-01-12 RX ORDER — FENOFIBRATE 145 MG/1
145 TABLET, COATED ORAL DAILY
Status: DISCONTINUED | OUTPATIENT
Start: 2018-01-12 | End: 2018-01-12

## 2018-01-12 RX ORDER — FENOFIBRATE 145 MG/1
145 TABLET, COATED ORAL DAILY
Status: COMPLETED | OUTPATIENT
Start: 2018-01-13 | End: 2018-01-13

## 2018-01-12 RX ORDER — SODIUM CHLORIDE 0.9 % (FLUSH) 0.9 %
1-10 SYRINGE (ML) INJECTION AS NEEDED
Status: DISCONTINUED | OUTPATIENT
Start: 2018-01-12 | End: 2018-01-12 | Stop reason: HOSPADM

## 2018-01-12 RX ORDER — ACETAMINOPHEN 325 MG/1
650 TABLET ORAL EVERY 6 HOURS PRN
Status: DISCONTINUED | OUTPATIENT
Start: 2018-01-12 | End: 2018-01-14 | Stop reason: HOSPADM

## 2018-01-12 RX ORDER — NICOTINE 21 MG/24HR
1 PATCH, TRANSDERMAL 24 HOURS TRANSDERMAL EVERY 24 HOURS
Start: 2018-01-12 | End: 2018-01-14 | Stop reason: HOSPADM

## 2018-01-12 RX ORDER — BUTALBITAL, ACETAMINOPHEN AND CAFFEINE 50; 325; 40 MG/1; MG/1; MG/1
1 TABLET ORAL EVERY 6 HOURS PRN
Status: DISCONTINUED | OUTPATIENT
Start: 2018-01-12 | End: 2018-01-14 | Stop reason: HOSPADM

## 2018-01-12 RX ORDER — PANTOPRAZOLE SODIUM 40 MG/1
40 TABLET, DELAYED RELEASE ORAL
Status: COMPLETED | OUTPATIENT
Start: 2018-01-13 | End: 2018-01-13

## 2018-01-12 RX ORDER — ALUMINA, MAGNESIA, AND SIMETHICONE 2400; 2400; 240 MG/30ML; MG/30ML; MG/30ML
15 SUSPENSION ORAL EVERY 6 HOURS PRN
Status: DISCONTINUED | OUTPATIENT
Start: 2018-01-12 | End: 2018-01-12

## 2018-01-12 RX ORDER — BUTALBITAL, ACETAMINOPHEN AND CAFFEINE 50; 325; 40 MG/1; MG/1; MG/1
1 TABLET ORAL EVERY 6 HOURS PRN
Status: DISCONTINUED | OUTPATIENT
Start: 2018-01-12 | End: 2018-01-12 | Stop reason: HOSPADM

## 2018-01-12 RX ORDER — ASPIRIN 81 MG/1
81 TABLET, CHEWABLE ORAL DAILY
Status: COMPLETED | OUTPATIENT
Start: 2018-01-12 | End: 2018-01-12

## 2018-01-12 RX ORDER — BUTALBITAL/ACETAMINOPHEN 50MG-325MG
1 TABLET ORAL EVERY 4 HOURS PRN
Status: DISCONTINUED | OUTPATIENT
Start: 2018-01-12 | End: 2018-01-12 | Stop reason: HOSPADM

## 2018-01-12 RX ORDER — ALPRAZOLAM 0.5 MG/1
1 TABLET ORAL 3 TIMES DAILY PRN
Status: DISCONTINUED | OUTPATIENT
Start: 2018-01-12 | End: 2018-01-12 | Stop reason: HOSPADM

## 2018-01-12 RX ORDER — IBUPROFEN 400 MG/1
400 TABLET ORAL EVERY 8 HOURS PRN
Status: DISCONTINUED | OUTPATIENT
Start: 2018-01-12 | End: 2018-01-14 | Stop reason: HOSPADM

## 2018-01-12 RX ORDER — HEPARIN SODIUM 5000 [USP'U]/ML
5000 INJECTION, SOLUTION INTRAVENOUS; SUBCUTANEOUS EVERY 12 HOURS SCHEDULED
Status: DISCONTINUED | OUTPATIENT
Start: 2018-01-12 | End: 2018-01-12

## 2018-01-12 RX ORDER — FLUOXETINE 10 MG/1
10 CAPSULE ORAL DAILY
Status: COMPLETED | OUTPATIENT
Start: 2018-01-12 | End: 2018-01-12

## 2018-01-12 RX ORDER — ASPIRIN 81 MG/1
81 TABLET, CHEWABLE ORAL DAILY
Status: COMPLETED | OUTPATIENT
Start: 2018-01-13 | End: 2018-01-13

## 2018-01-12 RX ORDER — IBUPROFEN 400 MG/1
400 TABLET ORAL EVERY 8 HOURS PRN
Start: 2018-01-12 | End: 2019-01-10

## 2018-01-12 RX ORDER — NEBIVOLOL 10 MG/1
10 TABLET ORAL DAILY
Status: COMPLETED | OUTPATIENT
Start: 2018-01-12 | End: 2018-01-12

## 2018-01-12 RX ORDER — FLUOXETINE 10 MG/1
10 CAPSULE ORAL DAILY
Status: COMPLETED | OUTPATIENT
Start: 2018-01-13 | End: 2018-01-13

## 2018-01-12 RX ORDER — PANTOPRAZOLE SODIUM 40 MG/1
40 TABLET, DELAYED RELEASE ORAL
Status: DISCONTINUED | OUTPATIENT
Start: 2018-01-12 | End: 2018-01-12 | Stop reason: HOSPADM

## 2018-01-12 RX ORDER — DEXTROSE MONOHYDRATE 25 G/50ML
25 INJECTION, SOLUTION INTRAVENOUS
Status: DISCONTINUED | OUTPATIENT
Start: 2018-01-12 | End: 2018-01-12 | Stop reason: HOSPADM

## 2018-01-12 RX ORDER — NEBIVOLOL 10 MG/1
10 TABLET ORAL DAILY
Status: COMPLETED | OUTPATIENT
Start: 2018-01-13 | End: 2018-01-13

## 2018-01-12 RX ORDER — ALPRAZOLAM 1 MG/1
1 TABLET ORAL 3 TIMES DAILY PRN
Status: DISCONTINUED | OUTPATIENT
Start: 2018-01-12 | End: 2018-01-14 | Stop reason: HOSPADM

## 2018-01-12 RX ORDER — ALUMINA, MAGNESIA, AND SIMETHICONE 2400; 2400; 240 MG/30ML; MG/30ML; MG/30ML
15 SUSPENSION ORAL EVERY 6 HOURS PRN
Status: DISCONTINUED | OUTPATIENT
Start: 2018-01-12 | End: 2018-01-14 | Stop reason: HOSPADM

## 2018-01-12 RX ORDER — IBUPROFEN 400 MG/1
400 TABLET ORAL EVERY 8 HOURS PRN
Status: DISCONTINUED | OUTPATIENT
Start: 2018-01-12 | End: 2018-01-12

## 2018-01-12 RX ORDER — ACETAMINOPHEN 325 MG/1
650 TABLET ORAL EVERY 6 HOURS PRN
Start: 2018-01-12 | End: 2018-01-14 | Stop reason: HOSPADM

## 2018-01-12 RX ORDER — LISINOPRIL 40 MG/1
40 TABLET ORAL DAILY
Status: DISCONTINUED | OUTPATIENT
Start: 2018-01-12 | End: 2018-01-12

## 2018-01-12 RX ORDER — NICOTINE 21 MG/24HR
1 PATCH, TRANSDERMAL 24 HOURS TRANSDERMAL EVERY 24 HOURS
Status: DISCONTINUED | OUTPATIENT
Start: 2018-01-12 | End: 2018-01-14 | Stop reason: HOSPADM

## 2018-01-12 RX ORDER — NICOTINE POLACRILEX 4 MG
15 LOZENGE BUCCAL
Status: DISCONTINUED | OUTPATIENT
Start: 2018-01-12 | End: 2018-01-12 | Stop reason: HOSPADM

## 2018-01-12 RX ORDER — NICOTINE 21 MG/24HR
1 PATCH, TRANSDERMAL 24 HOURS TRANSDERMAL EVERY 24 HOURS
Status: DISCONTINUED | OUTPATIENT
Start: 2018-01-12 | End: 2018-01-12 | Stop reason: HOSPADM

## 2018-01-12 RX ORDER — LISINOPRIL 40 MG/1
40 TABLET ORAL DAILY
Status: COMPLETED | OUTPATIENT
Start: 2018-01-13 | End: 2018-01-13

## 2018-01-12 RX ADMIN — TECHNETIUM TC 99M SESTAMIBI 1 DOSE: 1 INJECTION INTRAVENOUS at 12:41

## 2018-01-12 RX ADMIN — TECHNETIUM TC 99M SESTAMIBI 1 DOSE: 1 INJECTION INTRAVENOUS at 11:05

## 2018-01-12 RX ADMIN — REGADENOSON 0.4 MG: 0.08 INJECTION, SOLUTION INTRAVENOUS at 12:41

## 2018-01-12 RX ADMIN — FENOFIBRATE 145 MG: 145 TABLET, FILM COATED ORAL at 15:39

## 2018-01-12 RX ADMIN — NEBIVOLOL HYDROCHLORIDE 10 MG: 10 TABLET ORAL at 15:39

## 2018-01-12 RX ADMIN — HEPARIN SODIUM 5000 UNITS: 5000 INJECTION, SOLUTION INTRAVENOUS; SUBCUTANEOUS at 15:39

## 2018-01-12 RX ADMIN — ALPRAZOLAM 1 MG: 1 TABLET ORAL at 23:09

## 2018-01-12 RX ADMIN — LISINOPRIL 40 MG: 40 TABLET ORAL at 15:39

## 2018-01-12 RX ADMIN — ASPIRIN 81 MG: 81 TABLET, CHEWABLE ORAL at 15:39

## 2018-01-12 RX ADMIN — IOPAMIDOL 85 ML: 755 INJECTION, SOLUTION INTRAVENOUS at 09:41

## 2018-01-12 RX ADMIN — FLUOXETINE HYDROCHLORIDE 10 MG: 10 CAPSULE ORAL at 15:39

## 2018-01-12 NOTE — PLAN OF CARE
Problem: Activity Intolerance (Adult)  Goal: Identify Related Risk Factors and Signs and Symptoms  Outcome: Ongoing (interventions implemented as appropriate)    Goal: Activity Tolerance  Outcome: Ongoing (interventions implemented as appropriate)    Goal: Effective Energy Conservation Techniques  Outcome: Ongoing (interventions implemented as appropriate)      Problem: Suicide Risk (Adult,Obstetrics,Pediatric)  Goal: Identify Related Risk Factors and Signs and Symptoms  Outcome: Ongoing (interventions implemented as appropriate)    Goal: Strength-Based Wellness/Recovery  Outcome: Ongoing (interventions implemented as appropriate)    Goal: Physical Safety  Outcome: Ongoing (interventions implemented as appropriate)

## 2018-01-12 NOTE — H&P
Internal Medicine History and physical  INTERNAL MEDICINE   [facility]       Patient Identification:  Name: Dillan Rosario  Age: 58 y.o.  Sex: male  :  1960  MRN: [id number]             Primary Care Physician: Alexandre Liang MD                               Requesting Physician: Dr. Last Fox  Reason for Consultation: Medical management and evaluation     Chief Complaint:  3 days history of pressure-like discomfort in the chest with occasional stabbing, headache and elevated blood pressure.     History of Present Illness:   Patient is a 58-year-old male with past medical history remarkable for diabetes, hypertension or dyslipidemia history of chronic smoking and history of chest pains in the past 4 weeks he had left heart catheterization 5 years ago and a stress test about the same time.  He said that he has done well since then until 3-4 days ago when he started having pressure-like discomfort in the chest that comes and goes away it is at times associated with sweating and nausea.  Sometimes it feels like that he has been stabbed in his chest in the background of pressure.  Patient is also having headaches and his blood pressure is very very elevated.  Patient was recently OLOP.  Patient was seen by his primary care physician Dr. Liang who noticed increasing depression and was concerned for his safety.  He also was worried about his headache and elevated blood pressure and chest discomfort.  There was lots going on in his social life as documented in the consultation note from psychiatry service.  In the emergency room patient was given IV labetalol to control his blood pressure his initial troponin came back negative and EKG did not show any acute ischemic changes.  Patient was admitted to the psych unit because of underlying depression and possible suicidal ideation.  Patient admits to smoking about a half pack to 2 packs further depending upon his distress level and  doing it for long time.  he also has history of sleep apnea and uses CPAP.  Patient denies any orthopnea or PND.        Past Medical History:   Medical History         Past Medical History:   Diagnosis Date   • Anxiety     • Depression     • Diabetes mellitus     • GERD (gastroesophageal reflux disease)     • Head ache     • Hypertension           Past Surgical History:   Surgical History          Past Surgical History:   Procedure Laterality Date   • CHOLECYSTECTOMY             Home Meds:   Prescriptions Prior to Admission            Prescriptions Prior to Admission   Medication Sig Dispense Refill Last Dose   • ALPRAZolam (XANAX) 1 MG tablet Take 1 mg by mouth 3 (Three) Times a Day As Needed for Anxiety.         • aspirin 81 MG chewable tablet Chew 81 mg Daily.         • butalbital-acetaminophen  MG tablet tablet Take 1 tablet by mouth Every 4 (Four) Hours As Needed.         • Ezetimibe-Atorvastatin 10-10 MG tablet Take 10 mg by mouth Daily.         • fenofibrate 160 MG tablet Take 160 mg by mouth Daily.         • FLUoxetine (PROzac) 10 MG capsule Take 10 mg by mouth Daily.         • lisinopril (PRINIVIL,ZESTRIL) 40 MG tablet Take 1 tablet by mouth Daily. 30 tablet 1     • metFORMIN (GLUCOPHAGE) 500 MG tablet Take 500 mg by mouth 2 (Two) Times a Day With Meals.         • nebivolol (BYSTOLIC) 10 MG tablet Take 10 mg by mouth Daily.         • omeprazole (priLOSEC) 20 MG capsule Take 20 mg by mouth Daily.               Current Meds:      Current Facility-Administered Medications:   •  acetaminophen (TYLENOL) tablet 650 mg, 650 mg, Oral, Q6H PRN, Last Cifuentes III, MD, 650 mg at 01/11/18 2019  •  ALPRAZolam (XANAX) tablet 1 mg, 1 mg, Oral, TID PRN, Last Cifuentes III, MD, 1 mg at 01/11/18 2158  •  aluminum-magnesium hydroxide-simethicone (MAALOX MAX) 400-400-40 MG/5ML suspension 15 mL, 15 mL, Oral, Q6H PRN, Last Cifuentes III, MD  •  [START ON 1/12/2018] aspirin chewable tablet  81 mg, 81 mg, Oral, Daily, Lats Cifuentes III, MD  •  atorvastatin (LIPITOR) tablet 5 mg, 5 mg, Oral, Nightly, Last Cifuentes III, MD  •  butalbital-acetaminophen  MG tablet 1 tablet, 1 tablet, Oral, Q4H PRN, Last Cifuentes III, MD  •  [START ON 1/12/2018] fenofibrate (TRICOR) tablet 145 mg, 145 mg, Oral, Daily, Last Cifuentes III, MD  •  [START ON 1/12/2018] FLUoxetine (PROzac) capsule 10 mg, 10 mg, Oral, Daily, Last Cifuentes III, MD  •  magnesium hydroxide (MILK OF MAGNESIA) suspension 2400 mg/10mL 10 mL, 10 mL, Oral, Daily PRN, Last Cifuentes III, MD  •  metFORMIN (GLUCOPHAGE) tablet 500 mg, 500 mg, Oral, BID With Meals, Last Cifuentes III, MD, 500 mg at 01/11/18 2019  •  [START ON 1/12/2018] nebivolol (BYSTOLIC) tablet 10 mg, 10 mg, Oral, Daily, Last Cifuentes III, MD  •  nicotine (NICODERM CQ) 21 MG/24HR patch 1 patch, 1 patch, Transdermal, Q24H, Last Cifuentes III, MD  •  [START ON 1/12/2018] pantoprazole (PROTONIX) EC tablet 40 mg, 40 mg, Oral, Q AM, Last Cifuentes III, MD  Allergies:       Allergies   Allergen Reactions   • Codeine Confusion      Social History:         Social History   Substance Use Topics   • Smoking status: Current Every Day Smoker       Packs/day: 2.00       Years: 15.00   • Smokeless tobacco: Not on file   • Alcohol use No      Family History:        Family History   Problem Relation Age of Onset   • Drug abuse Son              Review of Systems  See history of present illness and past medical history..  Constitutional: Positive for diaphoresis. Negative for chills and fever.   HENT: Negative for sore throat.    Eyes: Negative.    Respiratory: Positive for shortness of breath. Negative for cough.    Cardiovascular: Positive for chest pain.   Gastrointestinal: Positive for diarrhea (x1 ). Negative for abdominal pain.   Genitourinary: Negative.  Negative for dysuria.    Musculoskeletal: Negative.  Negative for back pain and myalgias (leg pain ).   Skin: Negative.  Negative for rash.   Neurological: Positive for dizziness and headaches.      Vitals:   /79 (BP Location: Right arm, Patient Position: Lying)  Pulse 62  Temp 97 °F (36.1 °C) (Oral)   Resp 17  Wt 125 kg (275 lb)  SpO2 90%  BMI 35.31 kg/m2  I/O: No intake or output data in the 24 hours ending 01/11/18 2307  Exam:  General Appearance:    Alert, cooperative, no distress, appears stated age   Head:    Normocephalic, without obvious abnormality, atraumatic   Eyes:    PERRL, conjunctiva/corneas clear, EOM's intact, both eyes   Ears:    Normal external ear canals, both ears   Nose:   Nares normal, septum midline, mucosa normal, no drainage    or sinus tenderness   Throat:   Lips, tongue, gums normal; oral mucosa pink and moist   Neck:   Supple, symmetrical, trachea midline, no adenopathy;     thyroid:  no enlargement/tenderness/nodules; no carotid    bruit or JVD   Back:     Symmetric, no curvature, ROM normal, no CVA tenderness   Lungs:     Clear to auscultation bilaterally, respirations unlabored   Chest Wall:    No tenderness or deformity    Heart:    Regular rate and rhythm, S1 and S2 normal, no murmur, rub   or gallop   Abdomen:     Soft, non-tender, bowel sounds active all four quadrants,     no masses, no hepatomegaly, no splenomegaly   Extremities:   Extremities normal, atraumatic, no cyanosis or edema   Pulses:   Pulses palpable in all extremities; symmetric all extremities   Skin:   Skin color normal, Skin is warm and dry,  no rashes or palpable lesions   Neurologic:   CNII-XII intact, motor strength grossly intact, sensation grossly intact to light touch, no focal deficits noted      Data Review:       I reviewed the patient's new clinical results.     Results from last 7 days  Lab Units 01/11/18  1238   WBC 10*3/mm3 6.32   HEMOGLOBIN g/dL 16.7   PLATELETS 10*3/mm3 247         Results from last 7  days  Lab Units 01/11/18  1238   SODIUM mmol/L 138   POTASSIUM mmol/L 3.9   CHLORIDE mmol/L 101   CO2 mmol/L 26.1   BUN mg/dL 16   CREATININE mg/dL 1.09   CALCIUM mg/dL 9.9   GLUCOSE mg/dL 123*   EKG                                                      EKG time: 1137  Rhythm/Rate: sinus 78  P waves and [place]: normal  QRS, axis: normal    ST and T waves: normal          Assessment:        Active Hospital Problems (** Indicates Principal Problem)     Diagnosis Date Noted   • Depression with suicidal ideation [F32.9, R45.851] 01/11/2018   • HTN (hypertension) [I10] 01/11/2018   • DM2 (diabetes mellitus, type 2) [E11.9] 01/11/2018   • GERD (gastroesophageal reflux disease) [K21.9] 01/11/2018   • Headache [R51] 01/11/2018   • Dyslipidemia [E78.5] 01/11/2018   • ARIAN on CPAP [G47.33, Z99.89] 01/11/2018   • Smoking [F17.200] 01/11/2018   • Chest pain [R07.9] 01/11/2018       Resolved Hospital Problems     Diagnosis Date Noted Date Resolved   No resolved problems to display.         Plan:  Chest pain with typical and atypical features in the context of him being in his mid 50s with underlying history of smoking and uncontrolled blood pressure-rule out underlying coronary artery disease an acute coronary syndrome.  Plan: Transfer to telemetry unit check serial troponin and if negative get a stress test controlled his blood pressure provided with smoking cessation counseling provided with aspirin.  Uncontrolled hypertension multifactorial including smoking and stressors in his life continue his current medications when necessary hydralazine.  Diabetes mellitus continue metformin check Accu-Cheks and sliding scale coverage  Obstructive sleep apnea continue home CPAP  Anxiety and depression per Dr. Jacky Fox whose request to follow while he is at acute care part of the hospital  Continue with Protonix for his underlying gastroesophageal reflux disease        David Coles MD                   1/11/2018  11:07 PM  Much of this  encounter note is an electronic transcription/translation of spoken language to printed text. The electronic translation of spoken language may permit erroneous, or at times, nonsensical words or phrases to be inadvertently transcribed; Although I have reviewed the note for such errors, some may still exist

## 2018-01-12 NOTE — CONSULTS
CONSULT NOTE    INTERNAL MEDICINE   [facility]       Patient Identification:  Name: Dillan Rosario  Age: 58 y.o.  Sex: male  :  1960  MRN: [id number]             Date of Consultation:  2018       Primary Care Physician: Alexandre Liang MD                               Requesting Physician: Dr. Last Fox  Reason for Consultation: Medical management and evaluation    Chief Complaint:  3 days history of pressure-like discomfort in the chest with occasional stabbing, headache and elevated blood pressure.    History of Present Illness:   Patient is a 58-year-old male with past medical history remarkable for diabetes, hypertension or dyslipidemia history of chronic smoking and history of chest pains in the past 4 weeks he had left heart catheterization 5 years ago and a stress test about the same time.  He said that he has done well since then until 3-4 days ago when he started having pressure-like discomfort in the chest that comes and goes away it is at times associated with sweating and nausea.  Sometimes it feels like that he has been stabbed in his chest in the background of pressure.  Patient is also having headaches and his blood pressure is very very elevated.  Patient was recently OLOP.  Patient was seen by his primary care physician Dr. Liang who noticed increasing depression and was concerned for his safety.  He also was worried about his headache and elevated blood pressure and chest discomfort.  There was lots going on in his social life as documented in the consultation note from psychiatry service.  In the emergency room patient was given IV labetalol to control his blood pressure his initial troponin came back negative and EKG did not show any acute ischemic changes.  Patient was admitted to the psych unit because of underlying depression and possible suicidal ideation.  Patient admits to smoking about a half pack to 2 packs further depending upon his distress  level and doing it for long time.  he also has history of sleep apnea and uses CPAP.  Patient denies any orthopnea or PND.      Past Medical History:  Past Medical History:   Diagnosis Date   • Anxiety    • Depression    • Diabetes mellitus    • GERD (gastroesophageal reflux disease)    • Head ache    • Hypertension      Past Surgical History:  Past Surgical History:   Procedure Laterality Date   • CHOLECYSTECTOMY        Home Meds:  Prescriptions Prior to Admission   Medication Sig Dispense Refill Last Dose   • ALPRAZolam (XANAX) 1 MG tablet Take 1 mg by mouth 3 (Three) Times a Day As Needed for Anxiety.      • aspirin 81 MG chewable tablet Chew 81 mg Daily.      • butalbital-acetaminophen  MG tablet tablet Take 1 tablet by mouth Every 4 (Four) Hours As Needed.      • Ezetimibe-Atorvastatin 10-10 MG tablet Take 10 mg by mouth Daily.      • fenofibrate 160 MG tablet Take 160 mg by mouth Daily.      • FLUoxetine (PROzac) 10 MG capsule Take 10 mg by mouth Daily.      • lisinopril (PRINIVIL,ZESTRIL) 40 MG tablet Take 1 tablet by mouth Daily. 30 tablet 1    • metFORMIN (GLUCOPHAGE) 500 MG tablet Take 500 mg by mouth 2 (Two) Times a Day With Meals.      • nebivolol (BYSTOLIC) 10 MG tablet Take 10 mg by mouth Daily.      • omeprazole (priLOSEC) 20 MG capsule Take 20 mg by mouth Daily.        Current Meds:     Current Facility-Administered Medications:   •  acetaminophen (TYLENOL) tablet 650 mg, 650 mg, Oral, Q6H PRN, Last Cifuentes III, MD, 650 mg at 01/11/18 2019  •  ALPRAZolam (XANAX) tablet 1 mg, 1 mg, Oral, TID PRN, Last Cifuentes III, MD, 1 mg at 01/11/18 2158  •  aluminum-magnesium hydroxide-simethicone (MAALOX MAX) 400-400-40 MG/5ML suspension 15 mL, 15 mL, Oral, Q6H PRN, Last Cifuentes III, MD  •  [START ON 1/12/2018] aspirin chewable tablet 81 mg, 81 mg, Oral, Daily, Last Cifuentes III, MD  •  atorvastatin (LIPITOR) tablet 5 mg, 5 mg, Oral, Nightly, Last Baltazar  Esau CALLEJAS MD  •  butalbital-acetaminophen  MG tablet 1 tablet, 1 tablet, Oral, Q4H PRN, Last Cifuentes III, MD  •  [START ON 1/12/2018] fenofibrate (TRICOR) tablet 145 mg, 145 mg, Oral, Daily, Last Cifuentes III, MD  •  [START ON 1/12/2018] FLUoxetine (PROzac) capsule 10 mg, 10 mg, Oral, Daily, Last Cifuentes III, MD  •  magnesium hydroxide (MILK OF MAGNESIA) suspension 2400 mg/10mL 10 mL, 10 mL, Oral, Daily PRN, Last Cifuentes III, MD  •  metFORMIN (GLUCOPHAGE) tablet 500 mg, 500 mg, Oral, BID With Meals, Last Cifuentes III, MD, 500 mg at 01/11/18 2019  •  [START ON 1/12/2018] nebivolol (BYSTOLIC) tablet 10 mg, 10 mg, Oral, Daily, Last Cifuentes III, MD  •  nicotine (NICODERM CQ) 21 MG/24HR patch 1 patch, 1 patch, Transdermal, Q24H, Last Cifuentes III, MD  •  [START ON 1/12/2018] pantoprazole (PROTONIX) EC tablet 40 mg, 40 mg, Oral, Q AM, Last Cifuentes III, MD  Allergies:  Allergies   Allergen Reactions   • Codeine Confusion     Social History:   Social History   Substance Use Topics   • Smoking status: Current Every Day Smoker     Packs/day: 2.00     Years: 15.00   • Smokeless tobacco: Not on file   • Alcohol use No      Family History:  Family History   Problem Relation Age of Onset   • Drug abuse Son           Review of Systems  See history of present illness and past medical history..  Constitutional: Positive for diaphoresis. Negative for chills and fever.   HENT: Negative for sore throat.    Eyes: Negative.    Respiratory: Positive for shortness of breath. Negative for cough.    Cardiovascular: Positive for chest pain.   Gastrointestinal: Positive for diarrhea (x1 ). Negative for abdominal pain.   Genitourinary: Negative.  Negative for dysuria.   Musculoskeletal: Negative.  Negative for back pain and myalgias (leg pain ).   Skin: Negative.  Negative for rash.   Neurological: Positive for dizziness and  headaches.     Vitals:   /79 (BP Location: Right arm, Patient Position: Lying)  Pulse 62  Temp 97 °F (36.1 °C) (Oral)   Resp 17  Wt 125 kg (275 lb)  SpO2 90%  BMI 35.31 kg/m2  I/O: No intake or output data in the 24 hours ending 01/11/18 2307  Exam:  General Appearance:    Alert, cooperative, no distress, appears stated age   Head:    Normocephalic, without obvious abnormality, atraumatic   Eyes:    PERRL, conjunctiva/corneas clear, EOM's intact, both eyes   Ears:    Normal external ear canals, both ears   Nose:   Nares normal, septum midline, mucosa normal, no drainage    or sinus tenderness   Throat:   Lips, tongue, gums normal; oral mucosa pink and moist   Neck:   Supple, symmetrical, trachea midline, no adenopathy;     thyroid:  no enlargement/tenderness/nodules; no carotid    bruit or JVD   Back:     Symmetric, no curvature, ROM normal, no CVA tenderness   Lungs:     Clear to auscultation bilaterally, respirations unlabored   Chest Wall:    No tenderness or deformity    Heart:    Regular rate and rhythm, S1 and S2 normal, no murmur, rub   or gallop   Abdomen:     Soft, non-tender, bowel sounds active all four quadrants,     no masses, no hepatomegaly, no splenomegaly   Extremities:   Extremities normal, atraumatic, no cyanosis or edema   Pulses:   Pulses palpable in all extremities; symmetric all extremities   Skin:   Skin color normal, Skin is warm and dry,  no rashes or palpable lesions   Neurologic:   CNII-XII intact, motor strength grossly intact, sensation grossly intact to light touch, no focal deficits noted       Data Review:      I reviewed the patient's new clinical results.    Results from last 7 days  Lab Units 01/11/18  1238   WBC 10*3/mm3 6.32   HEMOGLOBIN g/dL 16.7   PLATELETS 10*3/mm3 247       Results from last 7 days  Lab Units 01/11/18  1238   SODIUM mmol/L 138   POTASSIUM mmol/L 3.9   CHLORIDE mmol/L 101   CO2 mmol/L 26.1   BUN mg/dL 16   CREATININE mg/dL 1.09   CALCIUM mg/dL 9.9    GLUCOSE mg/dL 123*   EKG                                                     EKG time: 1137  Rhythm/Rate: sinus 78  P waves and RI: normal  QRS, axis: normal    ST and T waves: normal         Assessment:  Active Hospital Problems (** Indicates Principal Problem)    Diagnosis Date Noted   • Depression with suicidal ideation [F32.9, R45.851] 01/11/2018   • HTN (hypertension) [I10] 01/11/2018   • DM2 (diabetes mellitus, type 2) [E11.9] 01/11/2018   • GERD (gastroesophageal reflux disease) [K21.9] 01/11/2018   • Headache [R51] 01/11/2018   • Dyslipidemia [E78.5] 01/11/2018   • ARIAN on CPAP [G47.33, Z99.89] 01/11/2018   • Smoking [F17.200] 01/11/2018   • Chest pain [R07.9] 01/11/2018      Resolved Hospital Problems    Diagnosis Date Noted Date Resolved   No resolved problems to display.       Plan:  Chest pain with typical and atypical features in the context of him being in his mid 50s with underlying history of smoking and uncontrolled blood pressure-rule out underlying coronary artery disease an acute coronary syndrome.  Plan: Transfer to telemetry unit check serial troponin and if negative get a stress test controlled his blood pressure provided with smoking cessation counseling provided with aspirin.  Uncontrolled hypertension multifactorial including smoking and stressors in his life continue his current medications when necessary hydralazine.  Diabetes mellitus continue metformin check Accu-Cheks and sliding scale coverage  Obstructive sleep apnea continue home CPAP  Anxiety and depression per Dr. Jacky Fox whose request to follow while he is at acute care part of the hospital  Continue with Protonix for his underlying gastroesophageal reflux disease      David Coles MD   1/11/2018  11:07 PM  Much of this encounter note is an electronic transcription/translation of spoken language to printed text. The electronic translation of spoken language may permit erroneous, or at times, nonsensical words or phrases to  be inadvertently transcribed; Although I have reviewed the note for such errors, some may still exist

## 2018-01-12 NOTE — DISCHARGE SUMMARY
NAME: Dillan Rosario ADMIT: 2018   : 1960  PCP: Alexandre Liang MD    MRN: [id number] LOS: 1 days   AGE/SEX: 58 y.o. male  ROOM: [facility]     Date of Admission:  2018  Date of Discharge:  2018    PCP: Alexandre Liang MD    CHIEF COMPLAINT  No chief complaint on file.  chest pain    DISCHARGE DIAGNOSIS  Active Hospital Problems (** Indicates Principal Problem)    Diagnosis Date Noted   • **Chest pain [R07.9] 2018   • HTN (hypertension) [I10] 2018   • DM2 (diabetes mellitus, type 2) [E11.9] 2018   • GERD (gastroesophageal reflux disease) [K21.9] 2018   • Dyslipidemia [E78.5] 2018   • Depression with suicidal ideation [F32.9, R45.851] 2018      Resolved Hospital Problems    Diagnosis Date Noted Date Resolved   No resolved problems to display.       SECONDARY DIAGNOSES  Past Medical History:   Diagnosis Date   • Anxiety    • Depression    • Diabetes mellitus    • GERD (gastroesophageal reflux disease)    • Head ache    • Hypertension        HOSPITAL COURSE  Patient is a 58 y.o. male with history of DM2, tobacco use, obesity, HTN. He presented initially and was admitted to the [facility] with worsening depression. Last night he did have chest pain c/w tightness.     He was transferred to telemetry admission for workup. Troponin and EKG was without ischemia. He did have negative stress test.    Because he had elevated d dimer CT PE and LE dopplers were done which were negative. His CP is improved but still occasionally present. It is likely musculoskeletal. Will give him prn ibuprofen. He will be discharged and admitted back to the [facility] for treatment for his depression. Metformin should be held for the next 48hours after receiving IV contrast.         DIAGNOSTICS    Stress test 18  · Left ventricular ejection fraction is normal (Calculated EF = 50%).  · Myocardial perfusion imaging indicates a normal myocardial perfusion study with no evidence of  ischemia.  · Impressions are consistent with a low risk study.      LE doppler 1/12/18  · Normal bilateral lower extremity venous duplex scan.      CT Angiogram Chest With & Without Contrast [[id number]] Not Reviewed        Order Status: Completed Collected: 01/12/18 0951        Updated: 01/12/18 1003       Narrative:         CT ANGIOGRAM CHEST W WO CONTRAST-     CLINICAL HISTORY: Dyspnea. Elevated d-dimer. Right hand swelling.     TECHNIQUE: Spiral CT images were obtained through the chest during rapid  IV injection of contrast and were reconstructed in 2 mm thick axial  slices. Multiple coronal and sagittal and 3-D reconstructions were  obtained.     Radiation dose reduction techniques were utilized, including automated  exposure control and exposure modulation based on body size.     COMPARISON: None     FINDINGS: The main pulmonary arteries and their lobar and segmental  branches are well opacified and demonstrate no filling defects. There is  no CT evidence of pulmonary embolism. The thoracic aorta was also well  opacified and is unremarkable except for a small amount of noncalcified  atherosclerotic plaque. Mild coronary artery calcification is noted.  There are a few slightly enlarged mediastinal lymph nodes are most  likely benign based on size criteria. Mild bilateral hilar  lymphadenopathy is also noted. Lung window images demonstrate no  parenchymal masses or infiltrates. There are no pleural effusions.          Impression:         Mild coronary artery calcification. Otherwise unremarkable  CTA of the chest with PE protocol. There is no CT evidence of pulmonary  embolism or other acute process within the chest.             PHYSICAL EXAM  Objective     Alert  nad  Obese  RRR  Lungs clear no resp distress    CONDITION ON DISCHARGE  Stable.      DISCHARGE DISPOSITION   [facility] [facility]      DISCHARGE MEDICATIONS   Dillan Rosario   Home Medication Instructions  ELVER:[id number]    Printed on:01/12/18 3109   Medication Information                      acetaminophen (TYLENOL) 325 MG tablet  Take 2 tablets by mouth Every 6 (Six) Hours As Needed for Mild Pain  or Moderate Pain  (severe pain (7-10)).             ALPRAZolam (XANAX) 1 MG tablet  Take 1 mg by mouth 3 (Three) Times a Day As Needed for Anxiety.             aspirin 81 MG chewable tablet  Chew 81 mg Daily.             butalbital-acetaminophen  MG tablet tablet  Take 1 tablet by mouth Every 4 (Four) Hours As Needed.             Ezetimibe-Atorvastatin 10-10 MG tablet  Take 10 mg by mouth Daily.             FLUoxetine (PROzac) 10 MG capsule  Take 10 mg by mouth Daily.             ibuprofen (ADVIL,MOTRIN) 400 MG tablet  Take 1 tablet by mouth Every 8 (Eight) Hours As Needed for Mild Pain .             lisinopril (PRINIVIL,ZESTRIL) 40 MG tablet  Take 1 tablet by mouth Daily.             nebivolol (BYSTOLIC) 10 MG tablet  Take 10 mg by mouth Daily.             nicotine (NICODERM CQ) 21 MG/24HR patch  Place 1 patch on the skin Daily.             omeprazole (priLOSEC) 20 MG capsule  Take 20 mg by mouth Daily.                No future appointments.  Follow-up Information     Follow up with Alexandre Liang MD .    Specialty:  Family Medicine    Contact information:    [address]  [address]  [facility] [address] [phone]            TEST  RESULTS PENDING AT DISCHARGE         Daniel Holloway MD  [facility]  01/12/18  4:42 PM      It was a pleasure taking care of this patient while in the hospital.

## 2018-01-12 NOTE — NURSING NOTE
Pt medically stable and ready for transfer back to [facility]. Discussed with Dr. Cifuentes, orders received for admission.

## 2018-01-13 RX ORDER — FLUOXETINE 10 MG/1
10 CAPSULE ORAL DAILY
Status: DISCONTINUED | OUTPATIENT
Start: 2018-01-13 | End: 2018-01-13

## 2018-01-13 RX ORDER — FENOFIBRATE 145 MG/1
145 TABLET, COATED ORAL DAILY
Status: DISCONTINUED | OUTPATIENT
Start: 2018-01-13 | End: 2018-01-14 | Stop reason: HOSPADM

## 2018-01-13 RX ORDER — ASPIRIN 81 MG/1
81 TABLET, CHEWABLE ORAL DAILY
Status: DISCONTINUED | OUTPATIENT
Start: 2018-01-13 | End: 2018-01-14 | Stop reason: HOSPADM

## 2018-01-13 RX ORDER — NEBIVOLOL 10 MG/1
10 TABLET ORAL DAILY
Status: DISCONTINUED | OUTPATIENT
Start: 2018-01-13 | End: 2018-01-14 | Stop reason: HOSPADM

## 2018-01-13 RX ORDER — LISINOPRIL 40 MG/1
40 TABLET ORAL DAILY
Status: DISCONTINUED | OUTPATIENT
Start: 2018-01-13 | End: 2018-01-14 | Stop reason: HOSPADM

## 2018-01-13 RX ORDER — FLUOXETINE HYDROCHLORIDE 20 MG/1
20 CAPSULE ORAL DAILY
Status: DISCONTINUED | OUTPATIENT
Start: 2018-01-13 | End: 2018-01-14 | Stop reason: HOSPADM

## 2018-01-13 RX ORDER — PANTOPRAZOLE SODIUM 40 MG/1
40 TABLET, DELAYED RELEASE ORAL
Status: DISCONTINUED | OUTPATIENT
Start: 2018-01-14 | End: 2018-01-14 | Stop reason: HOSPADM

## 2018-01-13 RX ADMIN — FENOFIBRATE 145 MG: 145 TABLET, FILM COATED ORAL at 09:17

## 2018-01-13 RX ADMIN — PANTOPRAZOLE SODIUM 40 MG: 40 TABLET, DELAYED RELEASE ORAL at 05:40

## 2018-01-13 RX ADMIN — ACETAMINOPHEN 650 MG: 325 TABLET ORAL at 20:15

## 2018-01-13 RX ADMIN — FLUOXETINE HYDROCHLORIDE 10 MG: 10 CAPSULE ORAL at 09:17

## 2018-01-13 RX ADMIN — NEBIVOLOL HYDROCHLORIDE 10 MG: 10 TABLET ORAL at 09:17

## 2018-01-13 RX ADMIN — ALPRAZOLAM 1 MG: 1 TABLET ORAL at 20:15

## 2018-01-13 RX ADMIN — LISINOPRIL 40 MG: 40 TABLET ORAL at 09:17

## 2018-01-13 RX ADMIN — ASPIRIN 81 MG: 81 TABLET, CHEWABLE ORAL at 09:17

## 2018-01-13 NOTE — H&P
The patient is a 58-year-old white charles briefly admitted to the [facility] with complaints of depression and suicidal ideation.    The patient was not seen by this physician, as it was felt that transfer to a monitored bed was necessitated.  Accordingly no H&P was undertaken at that time and this note will suffice as the required H&P for this admission.

## 2018-01-13 NOTE — PLAN OF CARE
Problem:  Patient Care Overview (Adult)  Goal: Plan of Care Review  Outcome: Ongoing (interventions implemented as appropriate)   01/13/18 0223   Coping/Psychosocial Response Interventions   Plan Of Care Reviewed With patient   Coping/Psychosocial   Patient Agreement with Plan of Care agrees   Patient Care Overview   Progress improving   Outcome Evaluation   Outcome Summary/Follow up Plan The patient rated his anxiety and depression at a 2/10. He denied any SI, HI, or hallucinations. The patient voiced a headache at a 5/10 but stated he did not want PRN Tylenol at that time. The patient has been pleasent and spent the evening [place]. Patient is resting well. Cooperative with medications. Continuing to monitor.      Goal: Interdisciplinary Rounds/Family Conference  Outcome: Ongoing (interventions implemented as appropriate)    Goal: Individualization and Mutuality  Outcome: Ongoing (interventions implemented as appropriate)    Goal: Discharge Needs Assessment  Outcome: Ongoing (interventions implemented as appropriate)      Problem:  Overarching Goals  Goal: Adheres to Safety Considerations for Self and Others  Outcome: Ongoing (interventions implemented as appropriate)    Intervention: Develop/maintain Individualized Safety Plan   01/12/18 2120   Safety   Safety Measures safety rounds completed;suicide assessment completed   Mental Health Homicide Risk   Homicidal Ideation no   Willingness to Contact Staff Member if Feeling Like Hurting Others yes   Provide Emotional/Physical Safety   Suicidal Ideation no   Willingness to Contact Staff Member if Feeling Like Hurting Self yes       Goal: Optimized Coping Skills in Response to Life Stressors  Outcome: Ongoing (interventions implemented as appropriate)    Intervention: Promote Effective Coping Strategies   01/12/18 2120   Coping Strategies   Supportive Measures active listening utilized;relaxation techniques promoted;self-care encouraged;self-reflection  promoted;self-responsibility promoted;verbalization of feelings encouraged       Goal: Develops/Participates in Therapeutic [place] to Support Successful Transition  Outcome: Ongoing (interventions implemented as appropriate)    Intervention: Foster Therapeutic [place]   01/12/18 2120   Coping Strategies   Trust Relationship/Rapport care explained;choices provided;emotional support provided;empathic listening provided;questions answered;questions encouraged;reassurance provided;thoughts/feelings acknowledged         Problem: Depression (Adult,Obstetrics,Pediatric)  Goal: Identify Related Risk Factors and Signs and Symptoms  Outcome: Outcome(s) achieved Date Met: 01/13/18 01/13/18 0223   Depression   Related Risk Factors (Depression) situational/maturational crisis   Signs and Symptoms (Depression) isolation;suicidal/homicidal behaviors/thoughts     Goal: Establish/Maintain Self-Care Routine  Outcome: Ongoing (interventions implemented as appropriate)   01/13/18 0223   Depression (Adult,Obstetrics,Pediatric)   Establish/Maintain Self-Care Routine making progress toward outcome     Goal: Improved/Stable Mood  Outcome: Ongoing (interventions implemented as appropriate)   01/13/18 0223   Depression (Adult,Obstetrics,Pediatric)   Improved/Stable Mood making progress toward outcome

## 2018-01-13 NOTE — H&P
"IDENTIFYING INFORMATION: The patient is a 58-year-old white male admitted reporting positive suicidal ideation and depression.    CHIEF COMPLAINT:  None given    INFORMANT:  Patient and chart    RELIABILITY:  Good    HISTORY OF PRESENT ILLNESS: The patient is a 58-year-old  ×5 white male admitted after he had voiced some suicidal ideation.  The patient was initially admitted on 1/10/2018, but was transferred to monitor cardiac bed.  The patient is the former [facility] [employer] and is now facing criminal allegations of having engaged in drug-related activities.  The patient is also being sued [facility] for several $100,000.  The patient vehemently denies his guilt in this activity.  The patient apparently voiced some suicidal ideation and has reported increasing depression.  He states that he has remained [place] stating \"it's the only place I feel safe\".  The patient claims to have to contract on his life one by the \"[employer]\" and the other by the \"[employer]\".  The patient has been prescribed Prozac by his primary care physician.  He has never seen a psychiatrist.  He denies prior suicide attempts or gestures and states \"I could never do that\".  Family health reports that they had recently found a noose in the patient's belongings.  The patient denies abuse of any psychoactive substances.  He does complain of some poor sleep.    PAST PSYCHIATRIC HISTORY: As above    PAST MEDICAL HISTORY:  The patient is obese and has a history of hypertension and GERD    MEDICATIONS: Xanax aspirin Fioricet TriCor Prozac Motrin Zestril by systolic Protonix    ALLERGIES:  Codeine    FAMILY HISTORY:  Noncontributory    SOCIAL HISTORY: The patient lives with his fifth wife.  He is a  of the [employer] and is noted previously, resigned as the [facility] in February 2017.  He denies abuse of any psychoactive substances.    MENTAL STATUS EXAM: The patient is obese white male appearing his stated " age.  He is no apparent physical distress of family examination.  He is awake alert and oriented in all spheres.  His mood is euthymic his affect congruent.  Speech is quite hyper inclusive but generally relevant coherent.  There are no gross sepsis in memory cognition noted.  Intelligence is judged to be in the average range based on fund of knowledge.  The patient is cooperative throughout the interview.  He denies current suicidal homicidal ideation or psychotic features.  His judgment and insight appear to be intact.    ASSETS/LIABILITIES: Motivation for change, supportive wife/impending legal and financial issues    DIAGNOSTIC IMPRESSION: Major depressive disorder single episode moderate, GERD, migraine headache, hypertension, obesity    PLAN:  The patient remains hospitalized for safety and stabilization.  He is able to promise safety in the hospital and I will discontinue suicide precautions and his sitter.  I will increase his Prozac dose to 20 mg daily continue other previously prescribed psychotropic medications.  We will look to arrange an individual and family therapy session during the patient stay in the hospital.  We will also look to arrange post discharge follow-up through the auspices of community health providers in the [facility] area.  Estimated length stay in the hospital 2-3 days.

## 2018-01-13 NOTE — PLAN OF CARE
Problem:  Patient Care Overview (Adult)  Goal: Plan of Care Review   01/13/18 0223 01/13/18 0800 01/13/18 1538   Coping/Psychosocial Response Interventions   Plan Of Care Reviewed With --  patient --    Coping/Psychosocial   Patient Agreement with Plan of Care --  agrees --    Patient Care Overview   Progress improving --  --    Outcome Evaluation   Outcome Summary/Follow up Plan --  --  Pt denied anxiety/depression, SI/HI, or visual/auditory hallucinations. The pt voiced some nausea, but stated relief within a short period of time. 1:1 observation discontinued as patient able to contract for safety. Was able to participate in groups and interacted appropriately on the unit throughout the shift, taking all scheduled medications.     Goal: Interdisciplinary Rounds/Family Conference  Outcome: Ongoing (interventions implemented as appropriate)    Goal: Individualization and Mutuality   01/13/18 1404   Mutuality/Individual Preferences   What Information Would Help Us Give You More Personalized Care? Pt goes by Elias     Goal: Discharge Needs Assessment   01/12/18 1740   Living Environment   Transportation Available family or friend will provide       Problem:  Overarching Goals  Goal: Adheres to Safety Considerations for Self and Others  Outcome: Ongoing (interventions implemented as appropriate)    Intervention: Develop/maintain Individualized Safety Plan   01/13/18 0800 01/13/18 1402   Safety   Safety Measures --  safety rounds completed   Mental Health Homicide Risk   Homicidal Ideation no --    Willingness to Contact Staff Member if Feeling Like Hurting Others yes --    Provide Emotional/Physical Safety   Suicidal Ideation no --    Willingness to Contact Staff Member if Feeling Like Hurting Self yes --        Goal: Optimized Coping Skills in Response to Life Stressors  Outcome: Ongoing (interventions implemented as appropriate)    Intervention: Promote Effective Coping Strategies   01/13/18 0800   Coping  Strategies   Supportive Measures active listening utilized;self-care encouraged;self-reflection promoted;self-responsibility promoted;verbalization of feelings encouraged       Goal: Develops/Participates in Therapeutic [place] to Support Successful Transition  Outcome: Ongoing (interventions implemented as appropriate)    Intervention: Foster Therapeutic [place]   01/13/18 0800   Coping Strategies   Trust Relationship/Rapport care explained;emotional support provided;empathic listening provided;questions encouraged;questions answered;reassurance provided;thoughts/feelings acknowledged     Intervention: Mutually Develop Transition Plan   01/13/18 0800   OTHER   Transition Support crisis management plan promoted         Problem: Risk for Self Injury/Neglect  Goal: Verbalize thoughts and feelings associated with:  Outcome: Ongoing (interventions implemented as appropriate)    Goal: Refrain from harming self  Outcome: Ongoing (interventions implemented as appropriate)    Goal: Attend and participate in unit activities, including therapeutic, recreational, and educational groups  Outcome: Ongoing (interventions implemented as appropriate)    Goal: Recognize maladaptive responses and adopt new coping mechanisms  Outcome: Ongoing (interventions implemented as appropriate)    Goal: Complete daily ADLs, including personal hygiene independently, as able  Outcome: Ongoing (interventions implemented as appropriate)      Problem: Depression (Adult,Obstetrics,Pediatric)  Intervention: Monitor/Manage Signs of Depression   01/13/18 0800   Coping Strategies   Supportive Measures active listening utilized;self-care encouraged;self-reflection promoted;self-responsibility promoted;verbalization of feelings encouraged   Coping/Psychosocial Interventions   Environmental Support calm environment promoted;environmental consistency promoted;rest periods encouraged;personal routine supported   Provide Emotional/Physical Safety   Suicidal  Ideation no   Willingness to Contact Staff Member if Feeling Like Hurting Self yes       Goal: Establish/Maintain Self-Care Routine   01/13/18 0223   Depression (Adult,Obstetrics,Pediatric)   Establish/Maintain Self-Care Routine making progress toward outcome     Goal: Improved/Stable Mood   01/13/18 0223   Depression (Adult,Obstetrics,Pediatric)   Improved/Stable Mood making progress toward outcome
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
## 2018-01-13 NOTE — PLAN OF CARE
Problem:  Patient Care Overview (Adult)  Goal: Individualization and Mutuality   01/13/18 1404   Mutuality/Individual Preferences   What Information Would Help Us Give You More Personalized Care? Pt goes by Elias

## 2018-01-14 VITALS
BODY MASS INDEX: 35.05 KG/M2 | TEMPERATURE: 97.1 F | DIASTOLIC BLOOD PRESSURE: 78 MMHG | HEART RATE: 69 BPM | WEIGHT: 273 LBS | RESPIRATION RATE: 18 BRPM | OXYGEN SATURATION: 96 % | SYSTOLIC BLOOD PRESSURE: 123 MMHG

## 2018-01-14 RX ORDER — FLUOXETINE HYDROCHLORIDE 20 MG/1
20 CAPSULE ORAL DAILY
Qty: 20 CAPSULE | Refills: 1 | Status: SHIPPED | OUTPATIENT
Start: 2018-01-15

## 2018-01-14 RX ORDER — NICOTINE 21 MG/24HR
1 PATCH, TRANSDERMAL 24 HOURS TRANSDERMAL EVERY 24 HOURS
Qty: 7 PATCH | Refills: 0 | Status: SHIPPED | OUTPATIENT
Start: 2018-01-15 | End: 2019-01-10

## 2018-01-14 RX ADMIN — LISINOPRIL 40 MG: 40 TABLET ORAL at 08:20

## 2018-01-14 RX ADMIN — FENOFIBRATE 145 MG: 145 TABLET, FILM COATED ORAL at 08:20

## 2018-01-14 RX ADMIN — NEBIVOLOL HYDROCHLORIDE 10 MG: 10 TABLET ORAL at 08:20

## 2018-01-14 RX ADMIN — PANTOPRAZOLE SODIUM 40 MG: 40 TABLET, DELAYED RELEASE ORAL at 06:39

## 2018-01-14 RX ADMIN — ASPIRIN 81 MG: 81 TABLET, CHEWABLE ORAL at 08:20

## 2018-01-14 RX ADMIN — FLUOXETINE HYDROCHLORIDE 20 MG: 20 CAPSULE ORAL at 08:20

## 2018-01-14 NOTE — PLAN OF CARE
Problem:  Patient Care Overview (Adult)  Goal: Plan of Care Review  Outcome: Ongoing (interventions implemented as appropriate)   01/14/18 0343   Coping/Psychosocial Response Interventions   Plan Of Care Reviewed With patient   Coping/Psychosocial   Patient Agreement with Plan of Care agrees   Patient Care Overview   Progress improving   Outcome Evaluation   Outcome Summary/Follow up Plan Pt open and cooperative with care. He reported a headache and some mild anxiety upon meeting this RN. Reports improved mood, denies SI, HI, or hallucinations. BP somewhat elevated, but not critically. PRNs xanax and Tylenol administered per pt request, and were effective. Pt talkative and open when interacting. He is agreeble to treatment plan, but is looking forward to couples therapy session and discharge. Continuing to monitor and support.      Goal: Individualization and Mutuality  Outcome: Ongoing (interventions implemented as appropriate)   01/14/18 0343   Behavioral Health Screens   Patient Personal Strengths ability to maintain sobriety;family/social support;compliant with treatment/medications;positive attitude       Problem:  Overarching Goals  Goal: Adheres to Safety Considerations for Self and Others    Intervention: Develop/maintain Individualized Safety Plan   01/14/18 0343   Safety   Safety Measures suicide check-in completed;safety rounds completed   Provide Emotional/Physical Safety   Suicidal Ideation no   Willingness to Contact Staff Member if Feeling Like Hurting Self yes   Mental Health Homicide Risk   Homicidal Ideation no   Willingness to Contact Staff Member if Feeling Like   01/14/18 0343   Safety   Safety Measures suicide check-in completed;safety rounds completed   Provide Emotional/Physical Safety   Suicidal Ideation no   Willingness to Contact Staff Member if Feeling Like Hurting Self yes   Mental Health Homicide Risk   Homicidal Ideation no   Willingness to Contact Staff Member if Feeling Like  Hurting Others yes    Hurting Others yes       Goal: Optimized Coping Skills in Response to Life Stressors    Intervention: Promote Effective Coping Strategies   01/14/18 0343   Coping Strategies   Supportive Measures active listening utilized;verbalization of feelings encouraged;positive reinforcement provided

## 2018-01-14 NOTE — DISCHARGE SUMMARY
DATES OF ADMISSION: 1/12/2018-1/14/2018    REASON FOR ADMISSION: The patient is 58-year-old white male admitted with increasing depression and suicidal ideation    LABS:  See chart    HOSPITAL COURSE:  Patient was admitted to the crisis management unit and placed on suicide precautions.  These were discontinued after the patient was able to promise safety in the hospital.  The patient's fluoxetine dose was increased from 10-20 mg daily but otherwise no medication changes were undertaken.  The patient to persuade actively within the therapeutic milieu and a positive family therapy session took place on 10/14/2018.  After the family session the patient was able to promise safety outside the hospital and discharge was ordered.    FINAL DIAGNOSIS:  Major depressive disorder recurrent moderate, migraine headache, obesity, hypertension    DISPOSITION ON DISCHARGE:  The patient will follow through the auspices of community mental health resources [facility].    PROGNOSIS: Dillan Vick   Home Medication Instructions ELVER:[id number]    Printed on:01/14/18 2202   Medication Information                      ALPRAZolam (XANAX) 1 MG tablet  Take 1 mg by mouth 3 (Three) Times a Day As Needed for Anxiety.             aspirin 81 MG chewable tablet  Chew 81 mg Daily.             butalbital-acetaminophen  MG tablet tablet  Take 1 tablet by mouth Every 4 (Four) Hours As Needed.             Ezetimibe-Atorvastatin 10-10 MG tablet  Take 10 mg by mouth Daily.             FLUoxetine (PROzac) 20 MG capsule  Take 1 capsule by mouth Daily.             ibuprofen (ADVIL,MOTRIN) 400 MG tablet  Take 1 tablet by mouth Every 8 (Eight) Hours As Needed for Mild Pain .             lisinopril (PRINIVIL,ZESTRIL) 40 MG tablet  Take 1 tablet by mouth Daily.             nebivolol (BYSTOLIC) 10 MG tablet  Take 10 mg by mouth Daily.             nicotine (NICODERM CQ) 21 MG/24HR patch  Place 1 patch on the skin Daily.              omeprazole (priLOSEC) 20 MG capsule  Take 20 mg by mouth Daily.
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
## 2018-01-15 NOTE — PROGRESS NOTES
Pt d/c'd home with wife.  SW contacted pt's home numbers to assist with follow up appointments but both numbers stated they were non working.  SW left voicemail on pt's wife's cell phone number requesting call back. Wife called back and stated pt does not have a phone currently but she will pass on a message.

## 2018-01-16 NOTE — PROGRESS NOTES
Case Management Discharge Note    Final Note: Pt d/c and sent to [facility] on 1/12/18.  Pt was admitted from [facility] on 1/12/18 early morning hours.      Discharge Placement     No information found             Discharge Codes: 65  Discharged/transferred to a psychiatric hospital or psyciatric distinct part unit of hospital (1/12/2018)

## 2018-01-16 NOTE — PROGRESS NOTES
Case Management Discharge Note    Final Note: Pt d/c and sent to [facility] on 1/12/18.  Pt was admitted from [facility] on 1/12/18 early morning hours.      Discharge Placement     Facility/Agency Request Status Selected? Address Phone Number Fax Number    [facility] Accepted    Yes [phone] [id number] [phone]              Discharge Codes: 65  Discharged/transferred to a psychiatric hospital or psyciatric distinct part unit of hospital (1/12/2018)

## 2018-01-16 NOTE — PROGRESS NOTES
Continued Stay Note  [facility]     Patient Name: Dillan Rosario  MRN: [id number]  Today's Date: 1/16/2018    Admit Date: 1/12/2018          Discharge Plan       01/16/18 1046    Final Note    Final Note Therapist noted that several attempts to contact pt for follow-up for mental health services were attempted, but no responses were received as a result of pt's phone currently not working. Therapist also attempted to contact pt's spouse stated that she would pass the message along to the pt, but no response has been received. At the present time, pt has declined follow-up.               Discharge Codes       01/16/18 1043    Discharge Codes    Discharge Codes 01  Discharge to home        Expected Discharge Date and Time     Expected Discharge Date Expected Discharge Time    Jan 14, 2018             Katiana Bennett LCSW

## 2019-01-10 ENCOUNTER — APPOINTMENT (OUTPATIENT)
Dept: CT IMAGING | Facility: HOSPITAL | Age: 59
End: 2019-01-10

## 2019-01-10 ENCOUNTER — HOSPITAL ENCOUNTER (OUTPATIENT)
Facility: HOSPITAL | Age: 59
Setting detail: OBSERVATION
Discharge: HOME | End: 2019-01-11
Attending: EMERGENCY MEDICINE | Admitting: INTERNAL MEDICINE

## 2019-01-10 ENCOUNTER — APPOINTMENT (OUTPATIENT)
Dept: GENERAL RADIOLOGY | Facility: HOSPITAL | Age: 59
End: 2019-01-10

## 2019-01-10 DIAGNOSIS — R19.7 DIARRHEA, UNSPECIFIED TYPE: ICD-10-CM

## 2019-01-10 DIAGNOSIS — J10.1 INFLUENZA A: ICD-10-CM

## 2019-01-10 DIAGNOSIS — J18.9 PNEUMONIA OF RIGHT MIDDLE LOBE DUE TO INFECTIOUS ORGANISM: Primary | ICD-10-CM

## 2019-01-10 PROBLEM — E87.6 HYPOKALEMIA: Status: ACTIVE | Noted: 2019-01-10

## 2019-01-10 PROBLEM — IMO0001 INFLUENZA, PNEUMONIA: Status: ACTIVE | Noted: 2019-01-10

## 2019-01-10 PROBLEM — E87.1 HYPONATREMIA: Status: ACTIVE | Noted: 2019-01-10

## 2019-01-10 LAB
ADV 40+41 DNA STL QL NAA+NON-PROBE: NOT DETECTED
ALBUMIN SERPL-MCNC: 4 G/DL (ref 3.5–5.2)
ALBUMIN/GLOB SERPL: 1.1 G/DL
ALP SERPL-CCNC: 33 U/L (ref 39–117)
ALT SERPL W P-5'-P-CCNC: 31 U/L (ref 1–41)
ANION GAP SERPL CALCULATED.3IONS-SCNC: 10.6 MMOL/L
AST SERPL-CCNC: 27 U/L (ref 1–40)
ASTRO TYP 1-8 RNA STL QL NAA+NON-PROBE: NOT DETECTED
B PARAPERT DNA SPEC QL NAA+PROBE: NOT DETECTED
B PERT DNA SPEC QL NAA+PROBE: NOT DETECTED
BASOPHILS # BLD AUTO: 0.02 10*3/MM3 (ref 0–0.2)
BASOPHILS NFR BLD AUTO: 0.4 % (ref 0–1.5)
BILIRUB SERPL-MCNC: 0.3 MG/DL (ref 0.1–1.2)
BILIRUB UR QL STRIP: NEGATIVE
BUN BLD-MCNC: 14 MG/DL (ref 6–20)
BUN/CREAT SERPL: 10.9 (ref 7–25)
C CAYETANENSIS DNA STL QL NAA+NON-PROBE: NOT DETECTED
C DIFF TOX GENS STL QL NAA+PROBE: NEGATIVE
C PNEUM DNA NPH QL NAA+NON-PROBE: NOT DETECTED
CALCIUM SPEC-SCNC: 9.6 MG/DL (ref 8.6–10.5)
CAMPY SP DNA.DIARRHEA STL QL NAA+PROBE: NOT DETECTED
CHLORIDE SERPL-SCNC: 95 MMOL/L (ref 98–107)
CLARITY UR: CLEAR
CO2 SERPL-SCNC: 28.4 MMOL/L (ref 22–29)
COLOR UR: YELLOW
CREAT BLD-MCNC: 1.29 MG/DL (ref 0.76–1.27)
CRYPTOSP STL CULT: NOT DETECTED
D-LACTATE SERPL-SCNC: 1.1 MMOL/L (ref 0.5–2)
DEPRECATED RDW RBC AUTO: 44.4 FL (ref 37–54)
E COLI DNA SPEC QL NAA+PROBE: NOT DETECTED
E HISTOLYT AG STL-ACNC: NOT DETECTED
EAEC PAA PLAS AGGR+AATA ST NAA+NON-PRB: NOT DETECTED
EC STX1 + STX2 GENES STL NAA+PROBE: NOT DETECTED
EOSINOPHIL # BLD AUTO: 0.03 10*3/MM3 (ref 0–0.7)
EOSINOPHIL NFR BLD AUTO: 0.6 % (ref 0.3–6.2)
EPEC EAE GENE STL QL NAA+NON-PROBE: NOT DETECTED
ERYTHROCYTE [DISTWIDTH] IN BLOOD BY AUTOMATED COUNT: 13.2 % (ref 11.5–14.5)
ETEC LTA+ST1A+ST1B TOX ST NAA+NON-PROBE: NOT DETECTED
FLUAV H1 2009 PAND RNA NPH QL NAA+PROBE: DETECTED
FLUAV H1 HA GENE NPH QL NAA+PROBE: NOT DETECTED
FLUAV H3 RNA NPH QL NAA+PROBE: NOT DETECTED
FLUAV SUBTYP SPEC NAA+PROBE: NOT DETECTED
FLUBV RNA ISLT QL NAA+PROBE: NOT DETECTED
G LAMBLIA DNA SPEC QL NAA+PROBE: NOT DETECTED
GFR SERPL CREATININE-BSD FRML MDRD: 57 ML/MIN/1.73
GFR SERPL CREATININE-BSD FRML MDRD: 69 ML/MIN/1.73
GLOBULIN UR ELPH-MCNC: 3.7 GM/DL
GLUCOSE BLD-MCNC: 101 MG/DL (ref 65–99)
GLUCOSE UR STRIP-MCNC: NEGATIVE MG/DL
HADV DNA SPEC NAA+PROBE: NOT DETECTED
HCOV 229E RNA SPEC QL NAA+PROBE: NOT DETECTED
HCOV HKU1 RNA SPEC QL NAA+PROBE: NOT DETECTED
HCOV NL63 RNA SPEC QL NAA+PROBE: NOT DETECTED
HCOV OC43 RNA SPEC QL NAA+PROBE: NOT DETECTED
HCT VFR BLD AUTO: 46.2 % (ref 40.4–52.2)
HGB BLD-MCNC: 15.7 G/DL (ref 13.7–17.6)
HGB UR QL STRIP.AUTO: NEGATIVE
HMPV RNA NPH QL NAA+NON-PROBE: NOT DETECTED
HPIV1 RNA SPEC QL NAA+PROBE: NOT DETECTED
HPIV2 RNA SPEC QL NAA+PROBE: NOT DETECTED
HPIV3 RNA NPH QL NAA+PROBE: NOT DETECTED
HPIV4 P GENE NPH QL NAA+PROBE: NOT DETECTED
IMM GRANULOCYTES # BLD AUTO: 0 10*3/MM3 (ref 0–0.03)
IMM GRANULOCYTES NFR BLD AUTO: 0 % (ref 0–0.5)
INR PPP: 1.12 (ref 0.9–1.1)
KETONES UR QL STRIP: NEGATIVE
LEUKOCYTE ESTERASE UR QL STRIP.AUTO: NEGATIVE
LYMPHOCYTES # BLD AUTO: 1.29 10*3/MM3 (ref 0.9–4.8)
LYMPHOCYTES NFR BLD AUTO: 27.2 % (ref 19.6–45.3)
M PNEUMO IGG SER IA-ACNC: NOT DETECTED
MCH RBC QN AUTO: 31.5 PG (ref 27–32.7)
MCHC RBC AUTO-ENTMCNC: 34 G/DL (ref 32.6–36.4)
MCV RBC AUTO: 92.6 FL (ref 79.8–96.2)
MONOCYTES # BLD AUTO: 0.46 10*3/MM3 (ref 0.2–1.2)
MONOCYTES NFR BLD AUTO: 9.7 % (ref 5–12)
NEUTROPHILS # BLD AUTO: 2.95 10*3/MM3 (ref 1.9–8.1)
NEUTROPHILS NFR BLD AUTO: 62.1 % (ref 42.7–76)
NITRITE UR QL STRIP: NEGATIVE
NOROVIRUS GI+II RNA STL QL NAA+NON-PROBE: NOT DETECTED
NT-PROBNP SERPL-MCNC: 273.1 PG/ML (ref 0–900)
P SHIGELLOIDES DNA STL QL NAA+NON-PROBE: NOT DETECTED
PH UR STRIP.AUTO: 6 [PH] (ref 5–8)
PLATELET # BLD AUTO: 165 10*3/MM3 (ref 140–500)
PMV BLD AUTO: 10.1 FL (ref 6–12)
POTASSIUM BLD-SCNC: 3.4 MMOL/L (ref 3.5–5.2)
PROCALCITONIN SERPL-MCNC: 0.05 NG/ML (ref 0.1–0.25)
PROT SERPL-MCNC: 7.7 G/DL (ref 6–8.5)
PROT UR QL STRIP: NEGATIVE
PROTHROMBIN TIME: 14.2 SECONDS (ref 11.7–14.2)
RBC # BLD AUTO: 4.99 10*6/MM3 (ref 4.6–6)
RHINOVIRUS RNA SPEC NAA+PROBE: NOT DETECTED
RSV RNA NPH QL NAA+NON-PROBE: NOT DETECTED
RV RNA STL NAA+PROBE: NOT DETECTED
SALMONELLA DNA SPEC QL NAA+PROBE: NOT DETECTED
SAPO I+II+IV+V RNA STL QL NAA+NON-PROBE: NOT DETECTED
SHIGELLA SP+EIEC IPAH STL QL NAA+PROBE: NOT DETECTED
SODIUM BLD-SCNC: 134 MMOL/L (ref 136–145)
SP GR UR STRIP: 1.02 (ref 1–1.03)
TROPONIN T SERPL-MCNC: <0.01 NG/ML (ref 0–0.03)
UROBILINOGEN UR QL STRIP: NORMAL
V CHOLERAE DNA SPEC QL NAA+PROBE: NOT DETECTED
VIBRIO DNA SPEC NAA+PROBE: NOT DETECTED
WBC NRBC COR # BLD: 4.75 10*3/MM3 (ref 4.5–10.7)
YERSINIA STL CULT: NOT DETECTED

## 2019-01-10 PROCEDURE — 74176 CT ABD & PELVIS W/O CONTRAST: CPT

## 2019-01-10 PROCEDURE — 99285 EMERGENCY DEPT VISIT HI MDM: CPT

## 2019-01-10 PROCEDURE — 96361 HYDRATE IV INFUSION ADD-ON: CPT

## 2019-01-10 PROCEDURE — 83605 ASSAY OF LACTIC ACID: CPT | Performed by: EMERGENCY MEDICINE

## 2019-01-10 PROCEDURE — 87581 M.PNEUMON DNA AMP PROBE: CPT | Performed by: EMERGENCY MEDICINE

## 2019-01-10 PROCEDURE — G0378 HOSPITAL OBSERVATION PER HR: HCPCS

## 2019-01-10 PROCEDURE — 87493 C DIFF AMPLIFIED PROBE: CPT | Performed by: INTERNAL MEDICINE

## 2019-01-10 PROCEDURE — 87040 BLOOD CULTURE FOR BACTERIA: CPT | Performed by: EMERGENCY MEDICINE

## 2019-01-10 PROCEDURE — 87507 IADNA-DNA/RNA PROBE TQ 12-25: CPT | Performed by: INTERNAL MEDICINE

## 2019-01-10 PROCEDURE — 94640 AIRWAY INHALATION TREATMENT: CPT

## 2019-01-10 PROCEDURE — 84484 ASSAY OF TROPONIN QUANT: CPT | Performed by: EMERGENCY MEDICINE

## 2019-01-10 PROCEDURE — 87177 OVA AND PARASITES SMEARS: CPT | Performed by: INTERNAL MEDICINE

## 2019-01-10 PROCEDURE — 84145 PROCALCITONIN (PCT): CPT | Performed by: EMERGENCY MEDICINE

## 2019-01-10 PROCEDURE — 87209 SMEAR COMPLEX STAIN: CPT | Performed by: INTERNAL MEDICINE

## 2019-01-10 PROCEDURE — 83880 ASSAY OF NATRIURETIC PEPTIDE: CPT | Performed by: EMERGENCY MEDICINE

## 2019-01-10 PROCEDURE — 36415 COLL VENOUS BLD VENIPUNCTURE: CPT

## 2019-01-10 PROCEDURE — 96372 THER/PROPH/DIAG INJ SC/IM: CPT

## 2019-01-10 PROCEDURE — 85025 COMPLETE CBC W/AUTO DIFF WBC: CPT | Performed by: EMERGENCY MEDICINE

## 2019-01-10 PROCEDURE — 93005 ELECTROCARDIOGRAM TRACING: CPT | Performed by: EMERGENCY MEDICINE

## 2019-01-10 PROCEDURE — 96360 HYDRATION IV INFUSION INIT: CPT

## 2019-01-10 PROCEDURE — 80053 COMPREHEN METABOLIC PANEL: CPT | Performed by: EMERGENCY MEDICINE

## 2019-01-10 PROCEDURE — 94799 UNLISTED PULMONARY SVC/PX: CPT

## 2019-01-10 PROCEDURE — 25010000002 ENOXAPARIN PER 10 MG: Performed by: INTERNAL MEDICINE

## 2019-01-10 PROCEDURE — 36415 COLL VENOUS BLD VENIPUNCTURE: CPT | Performed by: EMERGENCY MEDICINE

## 2019-01-10 PROCEDURE — 87486 CHLMYD PNEUM DNA AMP PROBE: CPT | Performed by: EMERGENCY MEDICINE

## 2019-01-10 PROCEDURE — 89125 SPECIMEN FAT STAIN: CPT | Performed by: INTERNAL MEDICINE

## 2019-01-10 PROCEDURE — 87633 RESP VIRUS 12-25 TARGETS: CPT | Performed by: EMERGENCY MEDICINE

## 2019-01-10 PROCEDURE — 87798 DETECT AGENT NOS DNA AMP: CPT | Performed by: EMERGENCY MEDICINE

## 2019-01-10 PROCEDURE — 85610 PROTHROMBIN TIME: CPT | Performed by: EMERGENCY MEDICINE

## 2019-01-10 PROCEDURE — 93010 ELECTROCARDIOGRAM REPORT: CPT | Performed by: INTERNAL MEDICINE

## 2019-01-10 PROCEDURE — 81003 URINALYSIS AUTO W/O SCOPE: CPT | Performed by: EMERGENCY MEDICINE

## 2019-01-10 PROCEDURE — 71046 X-RAY EXAM CHEST 2 VIEWS: CPT

## 2019-01-10 RX ORDER — POTASSIUM CHLORIDE 1.5 G/1.77G
40 POWDER, FOR SOLUTION ORAL AS NEEDED
Status: DISCONTINUED | OUTPATIENT
Start: 2019-01-10 | End: 2019-01-11 | Stop reason: HOSPADM

## 2019-01-10 RX ORDER — OSELTAMIVIR PHOSPHATE 75 MG/1
75 CAPSULE ORAL ONCE
Status: COMPLETED | OUTPATIENT
Start: 2019-01-10 | End: 2019-01-10

## 2019-01-10 RX ORDER — FLUOXETINE HYDROCHLORIDE 20 MG/1
20 CAPSULE ORAL DAILY
Status: DISCONTINUED | OUTPATIENT
Start: 2019-01-10 | End: 2019-01-11 | Stop reason: HOSPADM

## 2019-01-10 RX ORDER — PANTOPRAZOLE SODIUM 40 MG/1
40 TABLET, DELAYED RELEASE ORAL EVERY MORNING
Status: DISCONTINUED | OUTPATIENT
Start: 2019-01-11 | End: 2019-01-11 | Stop reason: HOSPADM

## 2019-01-10 RX ORDER — ONDANSETRON 2 MG/ML
4 INJECTION INTRAMUSCULAR; INTRAVENOUS EVERY 6 HOURS PRN
Status: DISCONTINUED | OUTPATIENT
Start: 2019-01-10 | End: 2019-01-11 | Stop reason: HOSPADM

## 2019-01-10 RX ORDER — SODIUM CHLORIDE 0.9 % (FLUSH) 0.9 %
10 SYRINGE (ML) INJECTION AS NEEDED
Status: DISCONTINUED | OUTPATIENT
Start: 2019-01-10 | End: 2019-01-11 | Stop reason: HOSPADM

## 2019-01-10 RX ORDER — IPRATROPIUM BROMIDE AND ALBUTEROL SULFATE 2.5; .5 MG/3ML; MG/3ML
3 SOLUTION RESPIRATORY (INHALATION)
Status: DISCONTINUED | OUTPATIENT
Start: 2019-01-10 | End: 2019-01-11 | Stop reason: HOSPADM

## 2019-01-10 RX ORDER — ACETAMINOPHEN 325 MG/1
650 TABLET ORAL EVERY 4 HOURS PRN
Status: DISCONTINUED | OUTPATIENT
Start: 2019-01-10 | End: 2019-01-11 | Stop reason: HOSPADM

## 2019-01-10 RX ORDER — OSELTAMIVIR PHOSPHATE 75 MG/1
75 CAPSULE ORAL EVERY 12 HOURS SCHEDULED
Status: DISCONTINUED | OUTPATIENT
Start: 2019-01-10 | End: 2019-01-11 | Stop reason: HOSPADM

## 2019-01-10 RX ORDER — SODIUM CHLORIDE 0.9 % (FLUSH) 0.9 %
3-10 SYRINGE (ML) INJECTION AS NEEDED
Status: DISCONTINUED | OUTPATIENT
Start: 2019-01-10 | End: 2019-01-11 | Stop reason: HOSPADM

## 2019-01-10 RX ORDER — SODIUM CHLORIDE 9 MG/ML
75 INJECTION, SOLUTION INTRAVENOUS CONTINUOUS
Status: DISCONTINUED | OUTPATIENT
Start: 2019-01-10 | End: 2019-01-11

## 2019-01-10 RX ORDER — ALPRAZOLAM 0.25 MG/1
1 TABLET ORAL 3 TIMES DAILY PRN
Status: DISCONTINUED | OUTPATIENT
Start: 2019-01-10 | End: 2019-01-11 | Stop reason: HOSPADM

## 2019-01-10 RX ORDER — SODIUM CHLORIDE 0.9 % (FLUSH) 0.9 %
3 SYRINGE (ML) INJECTION EVERY 12 HOURS SCHEDULED
Status: DISCONTINUED | OUTPATIENT
Start: 2019-01-10 | End: 2019-01-11 | Stop reason: HOSPADM

## 2019-01-10 RX ORDER — POTASSIUM CHLORIDE 750 MG/1
40 CAPSULE, EXTENDED RELEASE ORAL AS NEEDED
Status: DISCONTINUED | OUTPATIENT
Start: 2019-01-10 | End: 2019-01-11 | Stop reason: HOSPADM

## 2019-01-10 RX ORDER — ACETAMINOPHEN 500 MG
1000 TABLET ORAL ONCE
Status: COMPLETED | OUTPATIENT
Start: 2019-01-10 | End: 2019-01-10

## 2019-01-10 RX ADMIN — ACETAMINOPHEN 1000 MG: 500 TABLET, FILM COATED ORAL at 15:53

## 2019-01-10 RX ADMIN — ENOXAPARIN SODIUM 40 MG: 40 INJECTION SUBCUTANEOUS at 17:03

## 2019-01-10 RX ADMIN — SODIUM CHLORIDE, PRESERVATIVE FREE 3 ML: 5 INJECTION INTRAVENOUS at 20:51

## 2019-01-10 RX ADMIN — ALPRAZOLAM 1 MG: 0.25 TABLET ORAL at 20:59

## 2019-01-10 RX ADMIN — OSELTAMIVIR PHOSPHATE 75 MG: 75 CAPSULE ORAL at 20:52

## 2019-01-10 RX ADMIN — POTASSIUM CHLORIDE 40 MEQ: 750 CAPSULE, EXTENDED RELEASE ORAL at 17:03

## 2019-01-10 RX ADMIN — OSELTAMIVIR PHOSPHATE 75 MG: 75 CAPSULE ORAL at 12:56

## 2019-01-10 RX ADMIN — IPRATROPIUM BROMIDE AND ALBUTEROL SULFATE 3 ML: 2.5; .5 SOLUTION RESPIRATORY (INHALATION) at 20:40

## 2019-01-10 RX ADMIN — SODIUM CHLORIDE 125 ML/HR: 9 INJECTION, SOLUTION INTRAVENOUS at 15:55

## 2019-01-10 RX ADMIN — ACETAMINOPHEN 650 MG: 325 TABLET, FILM COATED ORAL at 20:59

## 2019-01-10 RX ADMIN — POTASSIUM CHLORIDE 40 MEQ: 750 CAPSULE, EXTENDED RELEASE ORAL at 20:51

## 2019-01-10 RX ADMIN — SODIUM CHLORIDE 1000 ML: 9 INJECTION, SOLUTION INTRAVENOUS at 10:40

## 2019-01-10 NOTE — ED NOTES
Pt c/o SOA with cough since yesterday. Pt also c/o diarrhea 3-4x per day for 3 months.      Yoli Schneider RN  01/10/19 8508

## 2019-01-10 NOTE — ED PROVIDER NOTES
EMERGENCY DEPARTMENT ENCOUNTER    CHIEF COMPLAINT  Chief Complaint: SOA  History given by: patient  History limited by: nothing  Room Number: 10/10  PMD: Alexandre Liang MD      HPI:  Pt is a 59 y.o. male who presents complaining of SOA that began yesterday. He has also had flu-like symptoms including cough, body aches, fever (highest [id number]) and chills for the last couple of days. 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 Pt also complains of about 4 episodes of diarrhea daily for the last four months. He has had associated abdominal pain as well. He has seen his PCP on three different occasions regarding this issue but he has not been to a GI physician. Pt just finished flagyl to treat his diarrhea. He has not given a stool sample for evaluation.  He has been taking immodium to treat.  Pt had a colonoscopy two years ago that showed benign polyps.     Duration:  Two days  Onset: gradual  Timing: constant  Location: n/a  Radiation: n/a  Quality: n/a  Intensity/Severity: moderate  Progression: worsening  Associated Symptoms: cough, body aches, chills, abdominal pain, diarrhea  Aggravating Factors: none  Alleviating Factors: none  Previous Episodes: none  Treatment before arrival: none    PAST MEDICAL HISTORY  Active Ambulatory Problems     Diagnosis Date Noted   • Depression with suicidal ideation 01/11/2018   • HTN (hypertension) 01/11/2018   • DM2 (diabetes mellitus, type 2) (CMS/HCC) 01/11/2018   • GERD (gastroesophageal reflux disease) 01/11/2018   • Headache 01/11/2018   • Dyslipidemia 01/11/2018   • ARIAN on CPAP 01/11/2018   • Smoking 01/11/2018   • Chest pain 01/11/2018     Resolved Ambulatory Problems     Diagnosis Date Noted   • No Resolved Ambulatory Problems     Past Medical History:   Diagnosis Date   • Anxiety    • Depression    • Diabetes mellitus (CMS/[facility])    • GERD (gastroesophageal reflux disease)    • Head ache    • Hypertension        PAST SURGICAL HISTORY  Past Surgical  History:   Procedure Laterality Date   • CHOLECYSTECTOMY         FAMILY HISTORY  Family History   Problem Relation Age of Onset   • Drug abuse Son        SOCIAL HISTORY  Social History     Socioeconomic History   • Marital status:      Spouse name: Not on file   • Number of children: Not on file   • Years of education: Not on file   • Highest education level: Not on file   Social Needs   • Financial resource strain: Not on file   • Food insecurity - worry: Not on file   • Food insecurity - inability: Not on file   • Transportation needs - medical: Not on file   • Transportation needs - non-medical: Not on file   Occupational History   • Not on file   Tobacco Use   • Smoking status: Current Every Day Smoker     Packs/day: 2.00     Years: 15.00     Pack years: 30.00   Substance and Sexual Activity   • Alcohol use: No   • Drug use: No     Comment: no VIRGIL use hx   • Sexual activity: Defer   Other Topics Concern   • Not on file   Social History Narrative   • Not on file       ALLERGIES  Codeine    REVIEW OF SYSTEMS  Review of Systems   Constitutional: Positive for chills. Negative for activity change, appetite change and fever.   HENT: Negative for congestion and sore throat.    Eyes: Negative.    Respiratory: Positive for cough and shortness of breath.    Cardiovascular: Negative for chest pain and leg swelling.   Gastrointestinal: Positive for abdominal pain and diarrhea. Negative for vomiting.   Endocrine: Negative.    Genitourinary: Negative for decreased urine volume and dysuria.   Musculoskeletal: Positive for myalgias (generalized). Negative for neck pain.   Skin: Negative for rash and wound.   Allergic/Immunologic: Negative.    Neurological: Negative for weakness, numbness and headaches.   Hematological: Negative.    Psychiatric/Behavioral: Negative.    All other systems reviewed and are negative.      PHYSICAL EXAM  ED Triage Vitals [01/10/19 0924]   Temp Heart Rate Resp BP SpO2   99.4 °F (37.4 °C) 99 22  -- 95 %      Temp src Heart Rate Source Patient Position BP Location FiO2 (%)   Tympanic Monitor -- -- --       Physical Exam   Constitutional: He is oriented to person, place, and time. He has a sickly appearance. No distress.   PT appears weak   HENT:   Head: Normocephalic and atraumatic.   Mouth/Throat: Oropharynx is clear and moist.   Eyes: EOM are normal. Pupils are equal, round, and reactive to light.   Neck: Normal range of motion. Neck supple.   Cardiovascular: Normal rate, regular rhythm and normal heart sounds.   HR=80s-90s   Pulmonary/Chest: Effort normal and breath sounds normal. No respiratory distress.   CTAB, Pt has obvious cough on exam.    Abdominal: Soft. There is no tenderness. There is no rebound and no guarding.   Musculoskeletal: Normal range of motion. He exhibits no edema.   Neurological: He is alert and oriented to person, place, and time. He has normal sensation and normal strength.   Skin: Skin is warm and dry.   Psychiatric: Mood and affect normal.   Nursing note and vitals reviewed.      LAB RESULTS  Lab Results (last 24 hours)     Procedure Component Value Units Date/Time    Respiratory Panel, PCR - Swab, Nasopharynx [[id number]]  (Abnormal) Collected:  01/10/19 1040    Specimen:  Swab from Nasopharynx Updated:  01/10/19 1148     ADENOVIRUS, PCR Not Detected     Coronavirus 229E Not Detected     Coronavirus HKU1 Not Detected     Coronavirus NL63 Not Detected     Coronavirus OC43 Not Detected     Human Metapneumovirus Not Detected     Human Rhinovirus/Enterovirus Not Detected     Influenza B PCR Not Detected     Parainfluenza Virus 1 Not Detected     Parainfluenza Virus 2 Not Detected     Parainfluenza Virus 3 Not Detected     Parainfluenza Virus 4 Not Detected     Bordetella pertussis pcr Not Detected     Influenza A H1 2009 PCR Detected     Chlamydophila pneumoniae PCR Not Detected     Mycoplasma pneumo by PCR Not Detected     Influenza A PCR Not Detected     Influenza A H3 Not Detected      Influenza A H1 Not Detected     RSV, PCR Not Detected     Bordetella parapertussis PCR Not Detected    CBC & Differential [[id number]] Collected:  01/10/19 1041    Specimen:  Blood Updated:  01/10/19 1059    Narrative:       The following orders were created for panel order CBC & Differential.  Procedure                               Abnormality         Status                     ---------                               -----------         ------                     CBC Auto Differential[[id number]]        Normal              Final result                 Please view results for these tests on the individual orders.    Comprehensive Metabolic Panel [[id number]]  (Abnormal) Collected:  01/10/19 1041    Specimen:  Blood Updated:  01/10/19 1123     Glucose 101 mg/dL      BUN 14 mg/dL      Creatinine 1.29 mg/dL      Sodium 134 mmol/L      Potassium 3.4 mmol/L      Chloride 95 mmol/L      CO2 28.4 mmol/L      Calcium 9.6 mg/dL      Total Protein 7.7 g/dL      Albumin 4.00 g/dL      ALT (SGPT) 31 U/L      AST (SGOT) 27 U/L      Alkaline Phosphatase 33 U/L      Total Bilirubin 0.3 mg/dL      eGFR Non African Amer 57 mL/min/1.73      eGFR  African Amer 69 mL/min/1.73      Globulin 3.7 gm/dL      A/G Ratio 1.1 g/dL      BUN/Creatinine Ratio 10.9     Anion Gap 10.6 mmol/L     Protime-INR [[id number]]  (Abnormal) Collected:  01/10/19 1041    Specimen:  Blood Updated:  01/10/19 1109     Protime 14.2 Seconds      INR 1.12    Procalcitonin [[id number]]  (Abnormal) Collected:  01/10/19 1041    Specimen:  Blood Updated:  01/10/19 1128     Procalcitonin 0.05 ng/mL     Narrative:       As a Marker for Sepsis (Non-Neonates):   1. <0.5 ng/mL represents a low risk of severe sepsis and/or septic shock.  1. >2 ng/mL represents a high risk of severe sepsis and/or septic shock.    As a Marker for Lower Respiratory Tract Infections that require antibiotic therapy:  PCT on Admission     Antibiotic Therapy             6-12 Hrs later  > 0.5         "        Strongly Recommended            >0.25 - <0.5         Recommended  0.1 - 0.25           Discouraged                   Remeasure/reassess PCT  <0.1                 Strongly Discouraged          Remeasure/reassess PCT      As 28 day mortality risk marker: \"Change in Procalcitonin Result\" (> 80 % or <=80 %) if Day 0 (or Day 1) and Day 4 values are available. Refer to [email or web address]   Change in PCT <=80 %   A decrease of PCT levels below or equal to 80 % defines a positive change in PCT test result representing a higher risk for 28-day all-cause mortality of patients diagnosed with severe sepsis or septic shock.  Change in PCT > 80 %   A decrease of PCT levels of more than 80 % defines a negative change in PCT result representing a lower risk for 28-day all-cause mortality of patients diagnosed with severe sepsis or septic shock.                Blood Culture - Blood, Arm, Left [[id number]] Collected:  01/10/19 1041    Specimen:  Blood from Arm, Left Updated:  01/10/19 1048    Lactic Acid, Plasma [[id number]]  (Normal) Collected:  01/10/19 1041    Specimen:  Blood Updated:  01/10/19 1119     Lactate 1.1 mmol/L     Troponin [[id number]]  (Normal) Collected:  01/10/19 1041    Specimen:  Blood Updated:  01/10/19 1124     Troponin T <0.010 ng/mL     Narrative:       Troponin T Reference Ranges:  Less than 0.03 ng/mL:    Negative for AMI  0.03 to 0.09 ng/mL:      Indeterminant for AMI  Greater than 0.09 ng/mL: Positive for AMI    BNP [[id number]]  (Normal) Collected:  01/10/19 1041    Specimen:  Blood Updated:  01/10/19 1124     proBNP 273.1 pg/mL     Narrative:       Among patients with dyspnea, NT-proBNP is highly sensitive for the detection of acute congestive heart failure. In addition NT-proBNP of <300 pg/ml effectively rules out acute congestive heart failure with 99% negative predictive value.    CBC Auto Differential [[id number]]  (Normal) Collected:  01/10/19 1041    Specimen:  Blood Updated: "  01/10/19 1059     WBC 4.75 10*3/mm3      RBC 4.99 10*6/mm3      Hemoglobin 15.7 g/dL      Hematocrit 46.2 %      MCV 92.6 fL      MCH 31.5 pg      MCHC 34.0 g/dL      RDW 13.2 %      RDW-SD 44.4 fl      MPV 10.1 fL      Platelets 165 10*3/mm3      Neutrophil % 62.1 %      Lymphocyte % 27.2 %      Monocyte % 9.7 %      Eosinophil % 0.6 %      Basophil % 0.4 %      Immature Grans % 0.0 %      Neutrophils, Absolute 2.95 10*3/mm3      Lymphocytes, Absolute 1.29 10*3/mm3      Monocytes, Absolute 0.46 10*3/mm3      Eosinophils, Absolute 0.03 10*3/mm3      Basophils, Absolute 0.02 10*3/mm3      Immature Grans, Absolute 0.00 10*3/mm3     Urinalysis With Microscopic If Indicated (No Culture) - Urine, Clean Catch [[id number]]  (Normal) Collected:  01/10/19 1203    Specimen:  Urine, Clean Catch Updated:  01/10/19 1222     Color, UA Yellow     Appearance, UA Clear     pH, UA 6.0     Specific Gravity, UA 1.018     Glucose, UA Negative     Ketones, UA Negative     Bilirubin, UA Negative     Blood, UA Negative     Protein, UA Negative     Leuk Esterase, UA Negative     Nitrite, UA Negative     Urobilinogen, UA 0.2 E.U./dL    Narrative:       Urine microscopic not indicated.    Blood Culture - Blood, Arm, Left [[id number]] Collected:  01/10/19 1203    Specimen:  Blood from Arm, Left Updated:  01/10/19 1214          I ordered the above labs and reviewed the results    RADIOLOGY  CT Abdomen Pelvis Without Contrast   Preliminary Result   1. No acute abnormality within the abdomen and pelvis.   2. Bilateral nonobstructing nephroliths.   3. Small cluster of nodules partly imaged within the anterior right   middle lobe are likely postinfectious.       These findings were discussed by telephone with Nando Ruiz MD.        Radiation dose reduction techniques were utilized, including automated   exposure control and exposure modulation based on body size.              XR Chest 2 View   Final Result   No active disease in the chest        This report was finalized on 1/10/2019 11:37 AM by Dr. Armando Dumont M.D.          Reviewed CT abd/pelvis which shows right middle lobe nodularity that is consistent with pneumonia. Independently viewed by me. Interpreted by radiologist. Discussed with Dr. Bruner.       I ordered the above noted radiological studies. Interpreted by radiologist. Discussed with radiologist (Dr. Bruner). Reviewed by me in PACS.       PROCEDURES  Procedures    EKG          EKG time: 0930  Rhythm/Rate: 82, SR  P waves and NV: IVCD  QRS, axis: incomplete RBBB, normal axis   ST and T waves: nonspecific ST and T wave changes  Normal QT interval     Interpreted Contemporaneously by me, independently viewed  unchanged compared to prior 1/12/18    PROGRESS AND CONSULTS     0927  Ordered EKG for further evaluation.     0950  Ordered labs, UA, and CXR for further evaluation.  Ordered IV fluids for hydration.    1149  Ordered CT abd/pelvis for further evaluation. Ordered tamiflu to treat.    1258  Placed call to [facility] for admission.     1319  Rechecked Pt who is resting comfortably. Informed Pt that he is positive for the flu which is likely responsible for his SOA, cough, body aches and chills. I also informed him that his CT abd/pelvis shows nothing acute in his abdomen, however there does appear to be an area of pneumonia in his right middle lung. Discussed options for discharge vs admission. Pt does not feel comfortable going home due to his weakness and would like to be admitted for further evaluation and treatment. I will admit Pt for further evaluation and treatment. Pt agrees to all plans. All questions answered.   Upon re-exam, Pt continues to appear weak and ill.     1339  Discussed Pt's case with Dr. Bearden ([facility]) who agrees to admit Pt to telemetry for further evaluation and treatment. He would like  me not to give antibiotics at this time.  He is going to come to see in the emergency department shortly    MEDICAL DECISION  MAKING  Results were reviewed/discussed with the patient and they were also made aware of online access. Pt also made aware that some labs, such as cultures, will not be resulted during ER visit and follow up with PMD is necessary.     MDM  Number of Diagnoses or Management Options     Amount and/or Complexity of Data Reviewed  Clinical lab tests: ordered and reviewed (Creatinine=1.29, lactate=1.1, troponin negative. Respiratory panel shows influenza A H1 2009 PCR)  Tests in the radiology section of CPT®: ordered and reviewed (CXR shows nothing acute. CT abd/pelvis shows nodularity in the right middle lobe consistent with pneumonia.)  Tests in the medicine section of CPT®: ordered and reviewed (See EKG note.)  Discussion of test results with the performing providers: yes (Dr. Bruner (radiology))  Decide to obtain previous medical records or to obtain history from someone other than the patient: yes  Review and summarize past medical records: (Pt had a stress test 1/12/18 that showed a normal myocardial perfusion and a normal EF. He has a h/x of severe depression and has been to the ER multiple times.)  Independent visualization of images, tracings, or specimens: yes           DIAGNOSIS  Final diagnoses:   Pneumonia of right middle lobe due to infectious organism (CMS/HCC)   Influenza A   Diarrhea, unspecified type       DISPOSITION  ADMISSION    Discussed treatment plan and reason for admission with pt/family and admitting physician.  Pt/family voiced understanding of the plan for admission for further testing/treatment as needed.       Latest Documented Vital Signs:  As of 3:22 PM  BP- 164/92 HR- 96 Temp- 99.4 °F (37.4 °C) (Tympanic) O2 sat- 91%    --  Documentation assistance provided by georgia Abbott for Dr. Ruiz.  Information recorded by the scrfernando was done at my direction and has been verified and validated by me.       Virginie Abbott  01/10/19 5531       Nando Ruiz MD  01/10/19 4576

## 2019-01-10 NOTE — PLAN OF CARE
Problem: Patient Care Overview  Goal: Plan of Care Review  Outcome: Ongoing (interventions implemented as appropriate)   01/10/19 1726   Coping/Psychosocial   Plan of Care Reviewed With patient   Plan of Care Review   Progress no change   OTHER   Outcome Summary Admitted to [facility]. VSS except temp running 102s. Still havin liquid stools and will get sample. IVF running. Continue to monitor.     Goal: Individualization and Mutuality  Outcome: Ongoing (interventions implemented as appropriate)    Goal: Discharge Needs Assessment  Outcome: Ongoing (interventions implemented as appropriate)    Goal: Interprofessional Rounds/Family Conf  Outcome: Ongoing (interventions implemented as appropriate)      Problem: Infection, Risk/Actual (Adult)  Goal: Identify Related Risk Factors and Signs and Symptoms  Outcome: Outcome(s) achieved Date Met: 01/10/19    Goal: Infection Prevention/Resolution  Outcome: Ongoing (interventions implemented as appropriate)

## 2019-01-10 NOTE — H&P
[facility] Admission H&P    Patient Care Team:  Alexandre Liang MD as PCP - General (Family Medicine)    Chief complaint: Shortness of breath, muscle aches, joint pains, diarrhea and abdominal pain ×4 months    History of Present Illness  This is a 59-year-old male with a history of hypertension, diabetes, anxiety and a 4 month history of abdominal pain and loose bowel movements who presented to the emergency room today with a 48 hour history of minimally productive cough with shortness of breath along with muscle aches, joint pains, and general flulike symptoms.  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  He has had some subjective fevers and chills at home.  In the emergency room he was found positive for influenza.  His oral intake has been very poor over the last 3 days.  He has felt nauseated but denies any vomiting.  His diarrhea he describes as loose, watery stools approximately 3-4 times per day and has been ongoing for the last 4 months.  He has associated generalized abdominal pain.  He was in the process of getting worked up in the outpatient setting by his primary care physician and a referral to gastroenterology was placed but he is yet to be seen.  His last colonoscopy was 2 years ago and he states he had benign polyps.  He denies any blood in his stool.      Past Medical History:   Diagnosis Date   • Anxiety    • Depression    • Diabetes mellitus (CMS/HCC)    • GERD (gastroesophageal reflux disease)    • Head ache    • Hypertension      Past Surgical History:   Procedure Laterality Date   • CHOLECYSTECTOMY       Family History   Problem Relation Age of Onset   • Drug abuse Son      Social History     Tobacco Use   • Smoking status: Current Every Day Smoker     Packs/day: 2.00     Years: 15.00     Pack years: 30.00   Substance Use Topics   • Alcohol use: No   • Drug use: No     Comment: no VIRGIL use hx     Medications  reviewed    Allergies:  Codeine    Review of Systems   Constitutional: Positive for chills, fatigue and fever.   HENT: Negative for congestion and sore throat.    Eyes: Negative for visual disturbance.   Respiratory: Positive for cough and shortness of breath. Negative for chest tightness and wheezing.    Cardiovascular: Negative for chest pain, palpitations and leg swelling.   Gastrointestinal: Positive for abdominal pain, diarrhea and nausea. Negative for abdominal distention and vomiting.   Endocrine: Negative for polydipsia and polyuria.   Genitourinary: Negative for difficulty urinating, dysuria, frequency and urgency.   Musculoskeletal: Positive for arthralgias and myalgias.   Skin: Negative for color change and rash.   Neurological: Positive for weakness and headaches. Negative for dizziness and light-headedness.        PHYSICAL EXAM    Vital Signs  tMax 24 hrs:  Temp (24hrs), Av.4 °F (37.4 °C), Min:99.4 °F (37.4 °C), Max:99.4 °F (37.4 °C)    Vitals Ranges:  Temp:  [99.4 °F (37.4 °C)] 99.4 °F (37.4 °C)  Heart Rate:  [86-99] 96  Resp:  [16-22] 16  BP: (157-172)/() 164/92    Physical Exam   Constitutional: He is oriented to person, place, and time. He appears well-developed and well-nourished. No distress.   Ill-appearing but not septic or toxic   HENT:   Head: Normocephalic and atraumatic.   Eyes: EOM are normal. Pupils are equal, round, and reactive to light.   Neck: Neck supple. No tracheal deviation present.   Cardiovascular: Normal rate and regular rhythm. Exam reveals no gallop.   No murmur heard.  Pulmonary/Chest: Effort normal. No respiratory distress. He has no wheezes.   Breath sounds are clear without wheezing or rhonchi.  No respiratory distress or increased work of breathing   Abdominal: Soft. Bowel sounds are normal. He exhibits no distension. There is tenderness. There is no rebound and no guarding.   He has mild generalized tenderness to palpation.  Abdominal obesity is present.    Musculoskeletal: He exhibits no edema or tenderness.   Neurological: He is alert and oriented to person, place, and time. No cranial nerve deficit.   Skin: Skin is warm and dry.   Nursing note and vitals reviewed.      Results Review:  Results from last 7 days   Lab Units  01/10/19   1041   WBC 10*3/mm3  4.75   HEMOGLOBIN g/dL  15.7   HEMATOCRIT %  46.2   PLATELETS 10*3/mm3  165     Results from last 7 days   Lab Units  01/10/19   1041   SODIUM mmol/L  134*   POTASSIUM mmol/L  3.4*   CHLORIDE mmol/L  95*   CO2 mmol/L  28.4   BUN mg/dL  14   CREATININE mg/dL  1.29*   CALCIUM mg/dL  9.6   BILIRUBIN mg/dL  0.3   ALK PHOS U/L  33*   ALT (SGPT) U/L  31   AST (SGOT) U/L  27   GLUCOSE mg/dL  101*     Chest x-ray:  PA and lateral radiographic views of chest demonstrate clear lungs. The  cardiomediastinal silhouette is within normal limits. The osseous  structures are remarkable for some degenerative phenomena within the  thoracic spine.    CT scan of the abdomen and pelvis:  1. No acute abnormality within the abdomen and pelvis.  2. Bilateral nonobstructing nephroliths.  3. Small cluster of nodules partly imaged within the anterior right  middle lobe are likely postinfectious.     I reviewed the patient's new clinical results.  I reviewed the patient's new imaging results and agree with the interpretation.        Active Hospital Problems    Diagnosis Date Noted   • **Influenza, pneumonia [J11.00] 01/10/2019   • Influenza A [J10.1] 01/10/2019   • Hypokalemia [E87.6] 01/10/2019   • Hyponatremia [E87.1] 01/10/2019   • Diarrhea [R19.7] 01/10/2019   • HTN (hypertension) [I10] 01/11/2018   • DM2 (diabetes mellitus, type 2) (CMS/HCC) [E11.9] 01/11/2018   • ARIAN on CPAP [G47.33, Z99.89] 01/11/2018      Resolved Hospital Problems   No resolved problems to display.       Assessment & Plan    The patient will be admitted for further care of his influenza and viral pneumonia.  Given his symptoms have only been ongoing for 2-3 days and  he has no leukocytosis or elevated pro calcitonin I do not think that he currently has a secondary bacterial pneumonia and thus does not need antibiotics.  He has been started on Tamiflu and we will continue supportive care.  He is dehydrated with slight rise of his creatinine.  We'll give IV fluids overnight tonight and reassess tomorrow.  I will order for potassium replacement.  I will hold his metformin and lisinopril for now.  Hopefully he will turn around quickly with these measures and if feeling better could go home as soon as tomorrow.    I will go ahead and check stool studies for his complaints of chronic diarrhea/abdominal pain and we will monitor.  He is in the process of referral to gastroenterology but at the moment I do not think a need to be consulted.  Additional plans based on his clinical course.    I discussed the patients findings and my recommendations with patient    Remi Bearden MD  01/10/19  3:25 PM

## 2019-01-11 VITALS
HEART RATE: 82 BPM | TEMPERATURE: 98.1 F | HEIGHT: 75 IN | SYSTOLIC BLOOD PRESSURE: 159 MMHG | WEIGHT: 273.5 LBS | BODY MASS INDEX: 34.01 KG/M2 | RESPIRATION RATE: 16 BRPM | OXYGEN SATURATION: 94 % | DIASTOLIC BLOOD PRESSURE: 89 MMHG

## 2019-01-11 PROBLEM — J18.9 PNEUMONIA OF RIGHT MIDDLE LOBE DUE TO INFECTIOUS ORGANISM: Status: ACTIVE | Noted: 2019-01-11

## 2019-01-11 LAB
ANION GAP SERPL CALCULATED.3IONS-SCNC: 13 MMOL/L
BASOPHILS # BLD AUTO: 0.01 10*3/MM3 (ref 0–0.2)
BASOPHILS NFR BLD AUTO: 0.2 % (ref 0–1.5)
BUN BLD-MCNC: 12 MG/DL (ref 6–20)
BUN/CREAT SERPL: 13.2 (ref 7–25)
CALCIUM SPEC-SCNC: 8.7 MG/DL (ref 8.6–10.5)
CHLORIDE SERPL-SCNC: 100 MMOL/L (ref 98–107)
CO2 SERPL-SCNC: 20 MMOL/L (ref 22–29)
CREAT BLD-MCNC: 0.91 MG/DL (ref 0.76–1.27)
DEPRECATED RDW RBC AUTO: 43.7 FL (ref 37–54)
EOSINOPHIL # BLD AUTO: 0.01 10*3/MM3 (ref 0–0.7)
EOSINOPHIL NFR BLD AUTO: 0.2 % (ref 0.3–6.2)
ERYTHROCYTE [DISTWIDTH] IN BLOOD BY AUTOMATED COUNT: 13.3 % (ref 11.5–14.5)
GFR SERPL CREATININE-BSD FRML MDRD: 104 ML/MIN/1.73
GFR SERPL CREATININE-BSD FRML MDRD: 86 ML/MIN/1.73
GLUCOSE BLD-MCNC: 91 MG/DL (ref 65–99)
HCT VFR BLD AUTO: 44.5 % (ref 40.4–52.2)
HGB BLD-MCNC: 14.9 G/DL (ref 13.7–17.6)
IMM GRANULOCYTES # BLD AUTO: 0 10*3/MM3 (ref 0–0.03)
IMM GRANULOCYTES NFR BLD AUTO: 0 % (ref 0–0.5)
LYMPHOCYTES # BLD AUTO: 1.34 10*3/MM3 (ref 0.9–4.8)
LYMPHOCYTES NFR BLD AUTO: 27.1 % (ref 19.6–45.3)
MCH RBC QN AUTO: 30.5 PG (ref 27–32.7)
MCHC RBC AUTO-ENTMCNC: 33.5 G/DL (ref 32.6–36.4)
MCV RBC AUTO: 91 FL (ref 79.8–96.2)
MONOCYTES # BLD AUTO: 0.67 10*3/MM3 (ref 0.2–1.2)
MONOCYTES NFR BLD AUTO: 13.6 % (ref 5–12)
NEUTROPHILS # BLD AUTO: 2.91 10*3/MM3 (ref 1.9–8.1)
NEUTROPHILS NFR BLD AUTO: 58.9 % (ref 42.7–76)
PLATELET # BLD AUTO: 152 10*3/MM3 (ref 140–500)
PMV BLD AUTO: 10.4 FL (ref 6–12)
POTASSIUM BLD-SCNC: 4 MMOL/L (ref 3.5–5.2)
RBC # BLD AUTO: 4.89 10*6/MM3 (ref 4.6–6)
SODIUM BLD-SCNC: 133 MMOL/L (ref 136–145)
WBC NRBC COR # BLD: 4.94 10*3/MM3 (ref 4.5–10.7)

## 2019-01-11 PROCEDURE — 96361 HYDRATE IV INFUSION ADD-ON: CPT

## 2019-01-11 PROCEDURE — 80048 BASIC METABOLIC PNL TOTAL CA: CPT | Performed by: INTERNAL MEDICINE

## 2019-01-11 PROCEDURE — 85025 COMPLETE CBC W/AUTO DIFF WBC: CPT | Performed by: INTERNAL MEDICINE

## 2019-01-11 PROCEDURE — 94799 UNLISTED PULMONARY SVC/PX: CPT

## 2019-01-11 PROCEDURE — G0378 HOSPITAL OBSERVATION PER HR: HCPCS

## 2019-01-11 RX ORDER — FLUTICASONE PROPIONATE 50 MCG
2 SPRAY, SUSPENSION (ML) NASAL DAILY
Qty: 1 BOTTLE | Refills: 0 | Status: SHIPPED | OUTPATIENT
Start: 2019-01-11

## 2019-01-11 RX ORDER — OSELTAMIVIR PHOSPHATE 75 MG/1
75 CAPSULE ORAL EVERY 12 HOURS SCHEDULED
Qty: 7 CAPSULE | Refills: 0 | Status: SHIPPED | OUTPATIENT
Start: 2019-01-11 | End: 2019-01-15

## 2019-01-11 RX ORDER — LISINOPRIL 40 MG/1
20 TABLET ORAL DAILY
Start: 2019-01-11

## 2019-01-11 RX ORDER — LOPERAMIDE HYDROCHLORIDE 2 MG/1
2 CAPSULE ORAL 4 TIMES DAILY PRN
Qty: 40 CAPSULE | Refills: 0 | Status: SHIPPED | OUTPATIENT
Start: 2019-01-11 | End: 2019-01-21

## 2019-01-11 RX ORDER — LOPERAMIDE HYDROCHLORIDE 2 MG/1
2 CAPSULE ORAL 4 TIMES DAILY PRN
Status: DISCONTINUED | OUTPATIENT
Start: 2019-01-11 | End: 2019-01-11 | Stop reason: HOSPADM

## 2019-01-11 RX ADMIN — SODIUM CHLORIDE 125 ML/HR: 9 INJECTION, SOLUTION INTRAVENOUS at 09:20

## 2019-01-11 RX ADMIN — OSELTAMIVIR PHOSPHATE 75 MG: 75 CAPSULE ORAL at 09:20

## 2019-01-11 RX ADMIN — ACETAMINOPHEN 650 MG: 325 TABLET, FILM COATED ORAL at 03:50

## 2019-01-11 RX ADMIN — FLUOXETINE HYDROCHLORIDE 20 MG: 20 CAPSULE ORAL at 09:20

## 2019-01-11 RX ADMIN — SODIUM CHLORIDE 125 ML/HR: 9 INJECTION, SOLUTION INTRAVENOUS at 00:47

## 2019-01-11 RX ADMIN — SODIUM CHLORIDE, PRESERVATIVE FREE 3 ML: 5 INJECTION INTRAVENOUS at 09:20

## 2019-01-11 RX ADMIN — PANTOPRAZOLE SODIUM 40 MG: 40 TABLET, DELAYED RELEASE ORAL at 06:42

## 2019-01-11 RX ADMIN — IPRATROPIUM BROMIDE AND ALBUTEROL SULFATE 3 ML: 2.5; .5 SOLUTION RESPIRATORY (INHALATION) at 07:56

## 2019-01-11 RX ADMIN — IPRATROPIUM BROMIDE AND ALBUTEROL SULFATE 3 ML: 2.5; .5 SOLUTION RESPIRATORY (INHALATION) at 11:49

## 2019-01-11 RX ADMIN — ACETAMINOPHEN 650 MG: 325 TABLET, FILM COATED ORAL at 09:24

## 2019-01-11 NOTE — PLAN OF CARE
Problem: Patient Care Overview  Goal: Plan of Care Review  Outcome: Ongoing (interventions implemented as appropriate)   01/11/19 0353   Coping/Psychosocial   Plan of Care Reviewed With patient   Plan of Care Review   Progress no change   OTHER   Outcome Summary Temp 100.2. Tylenol given x2. C/o body aches. No c/o nausea or SOA. IVF continued. Tamiflu given.  C. Diff negative.  K+ replaced. Will continue to monitor.     Goal: Individualization and Mutuality  Outcome: Ongoing (interventions implemented as appropriate)    Goal: Discharge Needs Assessment  Outcome: Ongoing (interventions implemented as appropriate)    Goal: Interprofessional Rounds/Family Conf  Outcome: Ongoing (interventions implemented as appropriate)      Problem: Infection, Risk/Actual (Adult)  Goal: Infection Prevention/Resolution  Outcome: Ongoing (interventions implemented as appropriate)

## 2019-01-11 NOTE — DISCHARGE SUMMARY
Date of Admission: 1/10/2019  Date of Discharge:  1/11/2019  Primary Care Physician: Alexandre Liang MD     Discharge Diagnosis:  Active Hospital Problems    Diagnosis Date Noted   • **Influenza, pneumonia [J11.00] 01/10/2019   • Influenza A [J10.1] 01/10/2019   • Hypokalemia [E87.6] 01/10/2019   • Hyponatremia [E87.1] 01/10/2019   • Diarrhea [R19.7] 01/10/2019   • HTN (hypertension) [I10] 01/11/2018   • DM2 (diabetes mellitus, type 2) (CMS/[facility]) [E11.9] 01/11/2018   • ARIAN on CPAP [G47.33, Z99.89] 01/11/2018      Resolved Hospital Problems   No resolved problems to display.       DETAILS OF HOSPITAL STAY     Pertinent Test Results and Procedures Performed  Ct abdomen and pelvis:  1. No acute abnormality within the abdomen and pelvis.  2. Bilateral nonobstructing nephroliths.  3. Small cluster of nodules partly imaged within the anterior right  middle lobe are likely postinfectious.    CXR:  PA and lateral radiographic views of chest demonstrate clear lungs. The  cardiomediastinal silhouette is within normal limits. The osseous  structures are remarkable for some degenerative phenomena within the  thoracic spine.    RVP positive for influenza A    GI PCR panel and a C. difficile toxin negative    Ova and parasite examination pending    Hospital Course  This is a very pleasant 59-year-old male who presented to the emergency room with flulike symptoms.  Please see H&P for full details of admission.  He was found to be dehydrated and was positive for influenza A.  He was found to have influenza pneumonia and prescribed Tamiflu.  IV fluids were administered for his dehydration.  He had no issues with hypoxia or changes in respiratory status from his baseline.  He is starting to feel better today.  He is feeling a little bit nauseated but no vomiting and tolerating a diet without difficulty.  He also reports chronic loose stools upwards of 3 times per day along with associated abdominal discomfort.  CT scan of the  abdomen and pelvis in the emergency room was negative.  I checked stool studies which were also negative.  Of note, ova and parasite exam is pending.  I will give him a prescription for as needed Imodium.  The patient desires discharge today given that he is now starting to feel better.  He is medically stable and will be released home.  Today he does complain of bilateral ear discomfort.  Otologic exam shows possible middle ear effusion but no evidence of otitis media or externa.  I will place him on nasal steroids and told him to take an over-the-counter decongestant.  I have instructed him to follow-up with his primary care physician in one week.  I've told him to remain off of work for one week.    Physical Exam at Discharge:  General: No acute distress, AAOx3  HEENT: EOMI, PERRL  Cardiovascular: +s1 and s2, RRR  Lungs: No rhonchi or wheezing  Abdomen: soft, nontender    Consults:   Consults     Date and Time Order Name Status Description    1/10/2019 1258 LHA (on-call MD unless specified) Completed             Condition on Discharge: Stable, improved    Discharge Disposition  Home or Self Care    Discharge Medications     Discharge Medications      New Medications      Instructions Start Date   fluticasone 50 MCG/ACT nasal spray  Commonly known as:  FLONASE   2 sprays, Nasal, Daily      loperamide 2 MG capsule  Commonly known as:  IMODIUM   2 mg, Oral, 4 Times Daily PRN      oseltamivir 75 MG capsule  Commonly known as:  TAMIFLU   75 mg, Oral, Every 12 Hours Scheduled         Continue These Medications      Instructions Start Date   ALPRAZolam 1 MG tablet  Commonly known as:  XANAX   1 mg, Oral, 3 Times Daily PRN      Ezetimibe-Atorvastatin 10-10 MG tablet   10 mg, Oral, Daily      FLUoxetine 20 MG capsule  Commonly known as:  PROzac   20 mg, Oral, Daily      lisinopril 40 MG tablet  Commonly known as:  PRINIVIL,ZESTRIL   20 mg, Oral, Daily      metFORMIN 500 MG tablet  Commonly known as:  GLUCOPHAGE   500 mg,  Oral, 2 Times Daily With Meals      omeprazole 20 MG capsule  Commonly known as:  priLOSEC   40 mg, Oral, Daily             Discharge Diet:   Diet Instructions     Diet: Regular, Consistent Carbohydrate; Thin      Discharge Diet:   Regular  Consistent Carbohydrate       Fluid Consistency:  Thin          Activity at Discharge:   Activity Instructions     Activity as Tolerated      Discharge Activity Restrictions      1) Return to work in 1 week          Follow-up Appointments  No future appointments.  Additional Instructions for the Follow-ups that You Need to Schedule     Discharge Follow-up with PCP   As directed       Currently Documented PCP:    Alexandre Liang MD    PCP Phone Number:    [phone]     Follow Up Details:  1 week               Test Results Pending at Discharge   Order Current Status    Fecal Fat, Qualitative - Stool, Per Rectum In process    Ova & Parasite Examination - Stool, Per Rectum In process    Blood Culture - Blood, Arm, Left Preliminary result    Blood Culture - Blood, Arm, Left Preliminary result          I have examined and discussed discharge planning with the patient today.     Remi Bearden MD  01/11/19  10:07 AM    Time: Discharge 20 min

## 2019-01-11 NOTE — PLAN OF CARE
Problem: Patient Care Overview  Goal: Plan of Care Review  Outcome: Outcome(s) achieved Date Met: 01/11/19 01/11/19 1048   Coping/Psychosocial   Plan of Care Reviewed With patient   Plan of Care Review   Progress improving   OTHER   Outcome Summary VSS. No fever so far this morning. Tylenol x1 for HA this AM. Being discharged.      Goal: Individualization and Mutuality  Outcome: Outcome(s) achieved Date Met: 01/11/19    Goal: Discharge Needs Assessment  Outcome: Outcome(s) achieved Date Met: 01/11/19    Goal: Interprofessional Rounds/Family Conf  Outcome: Outcome(s) achieved Date Met: 01/11/19      Problem: Infection, Risk/Actual (Adult)  Goal: Infection Prevention/Resolution  Outcome: Outcome(s) achieved Date Met: 01/11/19

## 2019-01-13 LAB
FATTY ACIDS: NORMAL
NEUTRAL FATS: NORMAL

## 2019-01-15 LAB
BACTERIA SPEC AEROBE CULT: NORMAL
BACTERIA SPEC AEROBE CULT: NORMAL

## 2019-01-17 LAB
O+P SPEC MICRO: NORMAL
OVA + PARASITE RESULT 1: NORMAL

## 2020-01-08 ENCOUNTER — APPOINTMENT (OUTPATIENT)
Dept: GENERAL RADIOLOGY | Facility: HOSPITAL | Age: 60
End: 2020-01-08

## 2020-01-08 ENCOUNTER — HOSPITAL ENCOUNTER (EMERGENCY)
Facility: HOSPITAL | Age: 60
Discharge: HOME | End: 2020-01-08
Attending: EMERGENCY MEDICINE | Admitting: EMERGENCY MEDICINE

## 2020-01-08 VITALS
HEIGHT: 72 IN | WEIGHT: 268 LBS | BODY MASS INDEX: 36.3 KG/M2 | DIASTOLIC BLOOD PRESSURE: 101 MMHG | OXYGEN SATURATION: 96 % | TEMPERATURE: 98.8 F | SYSTOLIC BLOOD PRESSURE: 165 MMHG | HEART RATE: 65 BPM | RESPIRATION RATE: 18 BRPM

## 2020-01-08 DIAGNOSIS — R07.89 ATYPICAL CHEST PAIN: ICD-10-CM

## 2020-01-08 DIAGNOSIS — I10 ELEVATED BLOOD PRESSURE READING WITH DIAGNOSIS OF HYPERTENSION: ICD-10-CM

## 2020-01-08 DIAGNOSIS — B34.9 ACUTE VIRAL SYNDROME: Primary | ICD-10-CM

## 2020-01-08 LAB
ALBUMIN SERPL-MCNC: 4.1 G/DL (ref 3.5–5.2)
ALBUMIN/GLOB SERPL: 1.1 G/DL
ALP SERPL-CCNC: 57 U/L (ref 39–117)
ALT SERPL W P-5'-P-CCNC: 30 U/L (ref 1–41)
ANION GAP SERPL CALCULATED.3IONS-SCNC: 12.1 MMOL/L (ref 5–15)
AST SERPL-CCNC: 23 U/L (ref 1–40)
BASOPHILS # BLD AUTO: 0.04 10*3/MM3 (ref 0–0.2)
BASOPHILS NFR BLD AUTO: 0.6 % (ref 0–1.5)
BILIRUB SERPL-MCNC: 0.3 MG/DL (ref 0.2–1.2)
BUN BLD-MCNC: 13 MG/DL (ref 6–20)
BUN/CREAT SERPL: 12.4 (ref 7–25)
CALCIUM SPEC-SCNC: 10.3 MG/DL (ref 8.6–10.5)
CHLORIDE SERPL-SCNC: 100 MMOL/L (ref 98–107)
CO2 SERPL-SCNC: 24.9 MMOL/L (ref 22–29)
CREAT BLD-MCNC: 1.05 MG/DL (ref 0.76–1.27)
DEPRECATED RDW RBC AUTO: 43 FL (ref 37–54)
EOSINOPHIL # BLD AUTO: 0.12 10*3/MM3 (ref 0–0.4)
EOSINOPHIL NFR BLD AUTO: 1.8 % (ref 0.3–6.2)
ERYTHROCYTE [DISTWIDTH] IN BLOOD BY AUTOMATED COUNT: 13 % (ref 12.3–15.4)
FLUAV AG NPH QL: NEGATIVE
FLUBV AG NPH QL IA: NEGATIVE
GFR SERPL CREATININE-BSD FRML MDRD: 72 ML/MIN/1.73
GFR SERPL CREATININE-BSD FRML MDRD: 88 ML/MIN/1.73
GLOBULIN UR ELPH-MCNC: 3.9 GM/DL
GLUCOSE BLD-MCNC: 103 MG/DL (ref 65–99)
HCT VFR BLD AUTO: 45 % (ref 37.5–51)
HGB BLD-MCNC: 15.4 G/DL (ref 13–17.7)
HOLD SPECIMEN: NORMAL
HOLD SPECIMEN: NORMAL
IMM GRANULOCYTES # BLD AUTO: 0.04 10*3/MM3 (ref 0–0.05)
IMM GRANULOCYTES NFR BLD AUTO: 0.6 % (ref 0–0.5)
LYMPHOCYTES # BLD AUTO: 2.36 10*3/MM3 (ref 0.7–3.1)
LYMPHOCYTES NFR BLD AUTO: 36.1 % (ref 19.6–45.3)
MAGNESIUM SERPL-MCNC: 1.8 MG/DL (ref 1.6–2.6)
MCH RBC QN AUTO: 30.6 PG (ref 26.6–33)
MCHC RBC AUTO-ENTMCNC: 34.2 G/DL (ref 31.5–35.7)
MCV RBC AUTO: 89.5 FL (ref 79–97)
MONOCYTES # BLD AUTO: 0.62 10*3/MM3 (ref 0.1–0.9)
MONOCYTES NFR BLD AUTO: 9.5 % (ref 5–12)
NEUTROPHILS # BLD AUTO: 3.35 10*3/MM3 (ref 1.7–7)
NEUTROPHILS NFR BLD AUTO: 51.4 % (ref 42.7–76)
NRBC BLD AUTO-RTO: 0 /100 WBC (ref 0–0.2)
PLATELET # BLD AUTO: 241 10*3/MM3 (ref 140–450)
PMV BLD AUTO: 9.5 FL (ref 6–12)
POTASSIUM BLD-SCNC: 4.1 MMOL/L (ref 3.5–5.2)
PROT SERPL-MCNC: 8 G/DL (ref 6–8.5)
RBC # BLD AUTO: 5.03 10*6/MM3 (ref 4.14–5.8)
SODIUM BLD-SCNC: 137 MMOL/L (ref 136–145)
TROPONIN T SERPL-MCNC: <0.01 NG/ML (ref 0–0.03)
TROPONIN T SERPL-MCNC: <0.01 NG/ML (ref 0–0.03)
WBC NRBC COR # BLD: 6.53 10*3/MM3 (ref 3.4–10.8)
WHOLE BLOOD HOLD SPECIMEN: NORMAL
WHOLE BLOOD HOLD SPECIMEN: NORMAL

## 2020-01-08 PROCEDURE — 83735 ASSAY OF MAGNESIUM: CPT | Performed by: NURSE PRACTITIONER

## 2020-01-08 PROCEDURE — 80053 COMPREHEN METABOLIC PANEL: CPT | Performed by: NURSE PRACTITIONER

## 2020-01-08 PROCEDURE — 85025 COMPLETE CBC W/AUTO DIFF WBC: CPT | Performed by: NURSE PRACTITIONER

## 2020-01-08 PROCEDURE — 84484 ASSAY OF TROPONIN QUANT: CPT | Performed by: NURSE PRACTITIONER

## 2020-01-08 PROCEDURE — 87804 INFLUENZA ASSAY W/OPTIC: CPT | Performed by: NURSE PRACTITIONER

## 2020-01-08 PROCEDURE — 93010 ELECTROCARDIOGRAM REPORT: CPT | Performed by: INTERNAL MEDICINE

## 2020-01-08 PROCEDURE — 96374 THER/PROPH/DIAG INJ IV PUSH: CPT

## 2020-01-08 PROCEDURE — 25010000002 KETOROLAC TROMETHAMINE PER 15 MG: Performed by: NURSE PRACTITIONER

## 2020-01-08 PROCEDURE — 71046 X-RAY EXAM CHEST 2 VIEWS: CPT

## 2020-01-08 PROCEDURE — 93005 ELECTROCARDIOGRAM TRACING: CPT | Performed by: EMERGENCY MEDICINE

## 2020-01-08 PROCEDURE — 93005 ELECTROCARDIOGRAM TRACING: CPT

## 2020-01-08 PROCEDURE — 99284 EMERGENCY DEPT VISIT MOD MDM: CPT

## 2020-01-08 RX ORDER — NAPROXEN 500 MG/1
500 TABLET ORAL 2 TIMES DAILY WITH MEALS
Qty: 25 TABLET | Refills: 0 | Status: SHIPPED | OUTPATIENT
Start: 2020-01-08

## 2020-01-08 RX ORDER — DICYCLOMINE HYDROCHLORIDE 10 MG/1
10 CAPSULE ORAL
COMMUNITY

## 2020-01-08 RX ORDER — LOPERAMIDE HYDROCHLORIDE 2 MG/1
2 CAPSULE ORAL ONCE
Status: COMPLETED | OUTPATIENT
Start: 2020-01-08 | End: 2020-01-08

## 2020-01-08 RX ORDER — SODIUM CHLORIDE 0.9 % (FLUSH) 0.9 %
10 SYRINGE (ML) INJECTION AS NEEDED
Status: DISCONTINUED | OUTPATIENT
Start: 2020-01-08 | End: 2020-01-08 | Stop reason: HOSPADM

## 2020-01-08 RX ORDER — ONDANSETRON 4 MG/1
4 TABLET, FILM COATED ORAL EVERY 8 HOURS PRN
Qty: 10 TABLET | Refills: 0 | Status: SHIPPED | OUTPATIENT
Start: 2020-01-08

## 2020-01-08 RX ORDER — METOPROLOL TARTRATE 100 MG/1
100 TABLET ORAL 2 TIMES DAILY
COMMUNITY

## 2020-01-08 RX ORDER — KETOROLAC TROMETHAMINE 15 MG/ML
15 INJECTION, SOLUTION INTRAMUSCULAR; INTRAVENOUS ONCE
Status: COMPLETED | OUTPATIENT
Start: 2020-01-08 | End: 2020-01-08

## 2020-01-08 RX ORDER — ASPIRIN 81 MG/1
81 TABLET ORAL DAILY
COMMUNITY

## 2020-01-08 RX ORDER — COLESEVELAM 180 1/1
1875 TABLET ORAL 2 TIMES DAILY WITH MEALS
COMMUNITY

## 2020-01-08 RX ADMIN — SODIUM CHLORIDE 1000 ML: 9 INJECTION, SOLUTION INTRAVENOUS at 12:37

## 2020-01-08 RX ADMIN — LOPERAMIDE HYDROCHLORIDE 2 MG: 2 CAPSULE ORAL at 14:16

## 2020-01-08 RX ADMIN — KETOROLAC TROMETHAMINE 15 MG: 15 INJECTION, SOLUTION INTRAMUSCULAR; INTRAVENOUS at 12:38

## 2020-01-08 NOTE — ED TRIAGE NOTES
Patient presents to er via private vehicle from home.  Patient is reporting chest pain that started last night.

## 2020-01-08 NOTE — ED PROVIDER NOTES
EMERGENCY DEPARTMENT ENCOUNTER    Room Number:  33/33  Date of encounter:  1/8/2020  PCP: Alexandre Liang MD  Historian: patient      HPI:  Chief Complaint: chest pain, elevated BP  Context: Dillan Rosario is a 60 y.o. male who presents to the ED c/o mild intermittent sometimes steady ache chest pain since Monday when he began coughing.  He has had fever, body aches, headache as well as nausea vomiting and diarrhea.  He denies any cardiac history, does not have a heart cardiac doctor and has never had a heart cath.  He states that his blood pressure has been more elevated despite taking his normal medications.  He has not taken any medications over-the-counter to help with these symptoms and nothing has made them better or worse.       MEDICAL HISTORY REVIEW      PAST MEDICAL HISTORY  Active Ambulatory Problems     Diagnosis Date Noted   • Depression with suicidal ideation 01/11/2018   • HTN (hypertension) 01/11/2018   • DM2 (diabetes mellitus, type 2) (CMS/[facility]) 01/11/2018   • GERD (gastroesophageal reflux disease) 01/11/2018   • Headache 01/11/2018   • Dyslipidemia 01/11/2018   • ARIAN on CPAP 01/11/2018   • Smoking 01/11/2018   • Chest pain 01/11/2018   • Influenza, pneumonia 01/10/2019   • Influenza A 01/10/2019   • Hypokalemia 01/10/2019   • Hyponatremia 01/10/2019   • Diarrhea 01/10/2019   • Pneumonia of right middle lobe due to infectious organism (CMS/[facility]) 01/11/2019     Resolved Ambulatory Problems     Diagnosis Date Noted   • No Resolved Ambulatory Problems     Past Medical History:   Diagnosis Date   • Anxiety    • Depression    • Diabetes mellitus (CMS/[facility])    • Head ache    • Hypertension    • ARIAN (obstructive sleep apnea)          PAST SURGICAL HISTORY  Past Surgical History:   Procedure Laterality Date   • CHOLECYSTECTOMY           FAMILY HISTORY  Family History   Problem Relation Age of Onset   • Drug abuse Son          SOCIAL HISTORY  Social History     Socioeconomic History   • Marital status:       Spouse name: Not on file   • Number of children: Not on file   • Years of education: Not on file   • Highest education level: Not on file   Tobacco Use   • Smoking status: Current Every Day Smoker     Packs/day: 0.50     Years: 15.00     Pack years: 7.50   Substance and Sexual Activity   • Alcohol use: No   • Drug use: No     Comment: no VIRGIL use hx   • Sexual activity: Defer         ALLERGIES  Codeine        REVIEW OF SYSTEMS  Review of Systems     All systems reviewed and negative except for those discussed in HPI.       PHYSICAL EXAM    I have reviewed the triage vital signs and nursing notes.    ED Triage Vitals   Temp Heart Rate Resp BP SpO2   01/08/20 1125 01/08/20 1111 01/08/20 1111 01/08/20 1125 01/08/20 1111   98.8 °F (37.1 °C) 75 18 (!) 190/95 98 %      Temp src Heart Rate Source Patient Position BP Location FiO2 (%)   -- -- -- -- --              GENERAL: not distressed  HENT: nares patent, mm moist  EYES: no scleral icterus  CV: regular rhythm, regular rate, no MRG  RESPIRATORY: normal effort, diminished in bases perhaps due to body habitus  ABDOMEN: soft  MUSCULOSKELETAL: no deformity  NEURO: alert, moves all extremities, follows commands  SKIN: warm, dry        LAB RESULTS  Recent Results (from the past 24 hour(s))   Influenza Antigen, Rapid - Swab, Nasopharynx    Collection Time: 01/08/20 11:36 AM   Result Value Ref Range    Influenza A Ag, EIA Negative Negative    Influenza B Ag, EIA Negative Negative   Light Blue Top    Collection Time: 01/08/20 11:36 AM   Result Value Ref Range    Extra Tube hold for add-on    Green Top (Gel)    Collection Time: 01/08/20 11:36 AM   Result Value Ref Range    Extra Tube Hold for add-ons.    Lavender Top    Collection Time: 01/08/20 11:36 AM   Result Value Ref Range    Extra Tube hold for add-on    Gold Top - SST    Collection Time: 01/08/20 11:36 AM   Result Value Ref Range    Extra Tube Hold for add-ons.    Comprehensive Metabolic Panel    Collection Time:  01/08/20 11:36 AM   Result Value Ref Range    Glucose 103 (H) 65 - 99 mg/dL    BUN 13 6 - 20 mg/dL    Creatinine 1.05 0.76 - 1.27 mg/dL    Sodium 137 136 - 145 mmol/L    Potassium 4.1 3.5 - 5.2 mmol/L    Chloride 100 98 - 107 mmol/L    CO2 24.9 22.0 - 29.0 mmol/L    Calcium 10.3 8.6 - 10.5 mg/dL    Total Protein 8.0 6.0 - 8.5 g/dL    Albumin 4.10 3.50 - 5.20 g/dL    ALT (SGPT) 30 1 - 41 U/L    AST (SGOT) 23 1 - 40 U/L    Alkaline Phosphatase 57 39 - 117 U/L    Total Bilirubin 0.3 0.2 - 1.2 mg/dL    eGFR Non African Amer 72 >60 mL/min/1.73    eGFR  African Amer 88 >60 mL/min/1.73    Globulin 3.9 gm/dL    A/G Ratio 1.1 g/dL    BUN/Creatinine Ratio 12.4 7.0 - 25.0    Anion Gap 12.1 5.0 - 15.0 mmol/L   Troponin    Collection Time: 01/08/20 11:36 AM   Result Value Ref Range    Troponin T <0.010 0.000 - 0.030 ng/mL   Magnesium    Collection Time: 01/08/20 11:36 AM   Result Value Ref Range    Magnesium 1.8 1.6 - 2.6 mg/dL   CBC Auto Differential    Collection Time: 01/08/20 11:36 AM   Result Value Ref Range    WBC 6.53 3.40 - 10.80 10*3/mm3    RBC 5.03 4.14 - 5.80 10*6/mm3    Hemoglobin 15.4 13.0 - 17.7 g/dL    Hematocrit 45.0 37.5 - 51.0 %    MCV 89.5 79.0 - 97.0 fL    MCH 30.6 26.6 - 33.0 pg    MCHC 34.2 31.5 - 35.7 g/dL    RDW 13.0 12.3 - 15.4 %    RDW-SD 43.0 37.0 - 54.0 fl    MPV 9.5 6.0 - 12.0 fL    Platelets 241 140 - 450 10*3/mm3    Neutrophil % 51.4 42.7 - 76.0 %    Lymphocyte % 36.1 19.6 - 45.3 %    Monocyte % 9.5 5.0 - 12.0 %    Eosinophil % 1.8 0.3 - 6.2 %    Basophil % 0.6 0.0 - 1.5 %    Immature Grans % 0.6 (H) 0.0 - 0.5 %    Neutrophils, Absolute 3.35 1.70 - 7.00 10*3/mm3    Lymphocytes, Absolute 2.36 0.70 - 3.10 10*3/mm3    Monocytes, Absolute 0.62 0.10 - 0.90 10*3/mm3    Eosinophils, Absolute 0.12 0.00 - 0.40 10*3/mm3    Basophils, Absolute 0.04 0.00 - 0.20 10*3/mm3    Immature Grans, Absolute 0.04 0.00 - 0.05 10*3/mm3    nRBC 0.0 0.0 - 0.2 /100 WBC   Troponin    Collection Time: 01/08/20  1:19 PM   Result  Value Ref Range    Troponin T <0.010 0.000 - 0.030 ng/mL       Ordered the above labs and independently reviewed the results.        RADIOLOGY  Xr Chest 2 View    Result Date: 1/8/2020  Chest radiograph  HISTORY:Shortness of air, cough  TECHNIQUE: Two PA and lateral radiographs  COMPARISON:Chest radiograph 01/10/2019      FINDINGS AND IMPRESSION: There is blunting of the right costophrenic angle which is new since 01/10/2019, suggestive of a small right pleural effusion. There is no pulmonary consolidation. No pneumothorax is seen. The heart is normal in size.  This report was finalized on 1/8/2020 12:01 PM by Dr. Uriah Petty M.D.        I ordered the above noted radiological studies. Reviewed by me and discussed with radiologist.  See dictation for official radiology interpretation.      PROCEDURES    Procedures      MEDICATIONS GIVEN IN ER    Medications   sodium chloride 0.9 % flush 10 mL (has no administration in time range)   sodium chloride 0.9 % bolus 1,000 mL (0 mL Intravenous Stopped 1/8/20 1421)   ketorolac (TORADOL) injection 15 mg (15 mg Intravenous Given 1/8/20 1238)   loperamide (IMODIUM) capsule 2 mg (2 mg Oral Given 1/8/20 1416)         PROGRESS, DATA ANALYSIS, CONSULTS, AND MEDICAL DECISION MAKING    1230:Reviewed pt's history and workup with Dr. Morales.  After a bedside evaluation, Dr. Morales agrees with the plan of care.    All labs have been independently reviewed by me.  All radiology studies have been reviewed by me and discussed with radiologist dictating the report.   EKG's independently viewed and interpreted by me.  Discussion below represents my analysis of pertinent findings related to patient's condition, differential diagnosis, treatment plan and final disposition.    DDX: CAP, influenza, CHF, rhinovirus, bronchitis  ED Course as of Jan 08 1523   Wed Jan 08, 2020   1141 EKG          EKG time: 1122  Rhythm/Rate: 73, sinus rhythm  P waves and MN: normal MN, normal SONIA  QRS, axis:  normal QRS, normal axis  ST and T waves: minimal acute ST elevation anterior leads    Interpreted Contemporaneously by me, independently viewed  Similar to prior 1/10/2019      [EP]   1259 Updated patient about 2nd troponin.  He is requesting a small bite to eat, but worried about diarrhea.  I will give him dose of Imodium here.        [EP]   1407 MDM: Troponin negative x 2, chest pain more likely to be related to URI symptoms.  BP improving as patient has been relaxing. He is not tachycardic or hypoxic. Wife with same symptoms at home.     [EP]      ED Course User Index  [EP] Natalia Kern, TIFFANY       AS OF 3:23 PM VITALS:    BP - (!) 165/101  HR - 65  TEMP - 98.8 °F (37.1 °C)  O2 SATS - 96%        DIAGNOSIS  Final diagnoses:   Acute viral syndrome   Elevated blood pressure reading with diagnosis of hypertension   Atypical chest pain         DISPOSITION  home             Natalia Kern, TIFFANY  01/08/20 1789

## 2020-01-08 NOTE — DISCHARGE INSTRUCTIONS
Wash/sanitize common household surfaces with antibacterial wipes.  Especially door knobs, light switches.    Change bed linens and wash bath towels/washcloths    Frequent handwashing    Cough/sneeze into your sleeve    Treat fever every 6-8 hours with adult Tylenol (generic acetaminophen) or Ibuprofen according to package directions.    Return Precautions    Although you are being discharged from the ED today, I encourage you to return for worsening symptoms.  Things can, and do, change such that treatment at home with medication may not be adequate.      Specifically, return for any of the following:    Chest pain, shortness of breath, pain or nausea and vomiting not controlled by medications provided.    Please make a follow up with your Primary Care Provider for a blood pressure recheck.

## 2020-01-08 NOTE — ED PROVIDER NOTES
Pt presents to the ED c/o mild intermittent chest pain that began 2 days ago. Pt is also c/o fever, chills, generalized myalgias, headache, dry cough, elevated BP, generalized weakness, dizziness and diarrhea. He reports taking HTN medications as usual and denies cardiac hx. He denies taking OTC cold medications.       On exam, mild chest wall tenderness to palpation, lungs CTAB.     Workup reviewed. Unremarkable EKG. I agree with the plan to obtain chest XR and labs with second troponin for further evaluation.     Attestation:    The DANDRE and I have discussed this patient's history, physical exam, and treatment plan.  I have reviewed the documentation and personally had a face to face interaction with the patient. I affirm the documentation and agree with the treatment and plan.  The attached note describes my personal findings.    Documentation assistance provided by georgia Gooden for . Information recorded by the scribe was done at my direction and has been verified and validated by me.     Mami Gooden  01/08/20 [id number]       Morales Morales MD  01/08/20 3873

## 2020-03-02 ENCOUNTER — HOSPITAL ENCOUNTER (OUTPATIENT)
Dept: URGENT CARE | Facility: CLINIC | Age: 60
Discharge: HOME | End: 2020-03-02
Attending: FAMILY MEDICINE

## 2021-01-18 RX ORDER — DICYCLOMINE HYDROCHLORIDE 10 MG/1
CAPSULE ORAL
Qty: 60 CAPSULE | OUTPATIENT
Start: 2021-01-18